# Patient Record
Sex: FEMALE | Race: AMERICAN INDIAN OR ALASKA NATIVE | NOT HISPANIC OR LATINO | URBAN - METROPOLITAN AREA
[De-identification: names, ages, dates, MRNs, and addresses within clinical notes are randomized per-mention and may not be internally consistent; named-entity substitution may affect disease eponyms.]

---

## 2021-01-16 ENCOUNTER — INPATIENT (INPATIENT)
Facility: HOSPITAL | Age: 51
LOS: 3 days | Discharge: HOME | End: 2021-01-20
Attending: HOSPITALIST | Admitting: HOSPITALIST
Payer: MEDICAID

## 2021-01-16 VITALS
DIASTOLIC BLOOD PRESSURE: 77 MMHG | RESPIRATION RATE: 16 BRPM | HEART RATE: 96 BPM | TEMPERATURE: 98 F | SYSTOLIC BLOOD PRESSURE: 126 MMHG | OXYGEN SATURATION: 100 %

## 2021-01-16 DIAGNOSIS — D64.9 ANEMIA, UNSPECIFIED: ICD-10-CM

## 2021-01-16 LAB
ALBUMIN SERPL ELPH-MCNC: 3.9 G/DL — SIGNIFICANT CHANGE UP (ref 3.3–5)
ALP SERPL-CCNC: 68 U/L — SIGNIFICANT CHANGE UP (ref 40–120)
ALT FLD-CCNC: 15 U/L — SIGNIFICANT CHANGE UP (ref 4–33)
ANION GAP SERPL CALC-SCNC: 12 MMOL/L — SIGNIFICANT CHANGE UP (ref 7–14)
APPEARANCE UR: ABNORMAL
APTT BLD: 27.7 SEC — SIGNIFICANT CHANGE UP (ref 27–36.3)
AST SERPL-CCNC: 22 U/L — SIGNIFICANT CHANGE UP (ref 4–32)
BASOPHILS # BLD AUTO: 0.04 K/UL — SIGNIFICANT CHANGE UP (ref 0–0.2)
BASOPHILS NFR BLD AUTO: 0.4 % — SIGNIFICANT CHANGE UP (ref 0–2)
BILIRUB SERPL-MCNC: <0.2 MG/DL — SIGNIFICANT CHANGE UP (ref 0.2–1.2)
BILIRUB UR-MCNC: NEGATIVE — SIGNIFICANT CHANGE UP
BLD GP AB SCN SERPL QL: NEGATIVE — SIGNIFICANT CHANGE UP
BLOOD GAS VENOUS COMPREHENSIVE RESULT: SIGNIFICANT CHANGE UP
BUN SERPL-MCNC: 13 MG/DL — SIGNIFICANT CHANGE UP (ref 7–23)
CALCIUM SERPL-MCNC: 8.8 MG/DL — SIGNIFICANT CHANGE UP (ref 8.4–10.5)
CHLORIDE SERPL-SCNC: 104 MMOL/L — SIGNIFICANT CHANGE UP (ref 98–107)
CO2 SERPL-SCNC: 23 MMOL/L — SIGNIFICANT CHANGE UP (ref 22–31)
COLOR SPEC: ABNORMAL
CREAT SERPL-MCNC: 0.8 MG/DL — SIGNIFICANT CHANGE UP (ref 0.5–1.3)
DIFF PNL FLD: ABNORMAL
EOSINOPHIL # BLD AUTO: 0 K/UL — SIGNIFICANT CHANGE UP (ref 0–0.5)
EOSINOPHIL NFR BLD AUTO: 0 % — SIGNIFICANT CHANGE UP (ref 0–6)
GLUCOSE SERPL-MCNC: 120 MG/DL — HIGH (ref 70–99)
GLUCOSE UR QL: NEGATIVE — SIGNIFICANT CHANGE UP
HCG SERPL-ACNC: <5 MIU/ML — SIGNIFICANT CHANGE UP
HCT VFR BLD CALC: 25.7 % — LOW (ref 34.5–45)
HGB BLD-MCNC: 7.6 G/DL — LOW (ref 11.5–15.5)
IANC: 9.08 K/UL — HIGH (ref 1.5–8.5)
IMM GRANULOCYTES NFR BLD AUTO: 0.9 % — SIGNIFICANT CHANGE UP (ref 0–1.5)
INR BLD: 1.05 RATIO — SIGNIFICANT CHANGE UP (ref 0.88–1.16)
KETONES UR-MCNC: ABNORMAL
LEUKOCYTE ESTERASE UR-ACNC: ABNORMAL
LYMPHOCYTES # BLD AUTO: 0.6 K/UL — LOW (ref 1–3.3)
LYMPHOCYTES # BLD AUTO: 5.8 % — LOW (ref 13–44)
MAGNESIUM SERPL-MCNC: 1.7 MG/DL — SIGNIFICANT CHANGE UP (ref 1.6–2.6)
MCHC RBC-ENTMCNC: 22.7 PG — LOW (ref 27–34)
MCHC RBC-ENTMCNC: 29.6 GM/DL — LOW (ref 32–36)
MCV RBC AUTO: 76.7 FL — LOW (ref 80–100)
MONOCYTES # BLD AUTO: 0.49 K/UL — SIGNIFICANT CHANGE UP (ref 0–0.9)
MONOCYTES NFR BLD AUTO: 4.8 % — SIGNIFICANT CHANGE UP (ref 2–14)
NEUTROPHILS # BLD AUTO: 9.08 K/UL — HIGH (ref 1.8–7.4)
NEUTROPHILS NFR BLD AUTO: 88.1 % — HIGH (ref 43–77)
NITRITE UR-MCNC: NEGATIVE — SIGNIFICANT CHANGE UP
NRBC # BLD: 0 /100 WBCS — SIGNIFICANT CHANGE UP
NRBC # FLD: 0 K/UL — SIGNIFICANT CHANGE UP
PH UR: 6 — SIGNIFICANT CHANGE UP (ref 5–8)
PHOSPHATE SERPL-MCNC: 4 MG/DL — SIGNIFICANT CHANGE UP (ref 2.5–4.5)
PLATELET # BLD AUTO: 308 K/UL — SIGNIFICANT CHANGE UP (ref 150–400)
POTASSIUM SERPL-MCNC: 3.9 MMOL/L — SIGNIFICANT CHANGE UP (ref 3.5–5.3)
POTASSIUM SERPL-SCNC: 3.9 MMOL/L — SIGNIFICANT CHANGE UP (ref 3.5–5.3)
PROT SERPL-MCNC: 6.8 G/DL — SIGNIFICANT CHANGE UP (ref 6–8.3)
PROT UR-MCNC: ABNORMAL
PROTHROM AB SERPL-ACNC: 11.9 SEC — SIGNIFICANT CHANGE UP (ref 10.6–13.6)
RBC # BLD: 3.35 M/UL — LOW (ref 3.8–5.2)
RBC # FLD: 15.2 % — HIGH (ref 10.3–14.5)
RH IG SCN BLD-IMP: POSITIVE — SIGNIFICANT CHANGE UP
RH IG SCN BLD-IMP: POSITIVE — SIGNIFICANT CHANGE UP
SARS-COV-2 RNA SPEC QL NAA+PROBE: SIGNIFICANT CHANGE UP
SODIUM SERPL-SCNC: 139 MMOL/L — SIGNIFICANT CHANGE UP (ref 135–145)
SP GR SPEC: 1.02 — SIGNIFICANT CHANGE UP (ref 1.01–1.02)
UROBILINOGEN FLD QL: SIGNIFICANT CHANGE UP
WBC # BLD: 10.3 K/UL — SIGNIFICANT CHANGE UP (ref 3.8–10.5)
WBC # FLD AUTO: 10.3 K/UL — SIGNIFICANT CHANGE UP (ref 3.8–10.5)

## 2021-01-16 PROCEDURE — 99218: CPT

## 2021-01-16 PROCEDURE — 71045 X-RAY EXAM CHEST 1 VIEW: CPT | Mod: 26

## 2021-01-16 PROCEDURE — 76830 TRANSVAGINAL US NON-OB: CPT | Mod: 26

## 2021-01-16 RX ORDER — ACETAMINOPHEN 500 MG
975 TABLET ORAL ONCE
Refills: 0 | Status: COMPLETED | OUTPATIENT
Start: 2021-01-16 | End: 2021-01-16

## 2021-01-16 RX ADMIN — Medication 975 MILLIGRAM(S): at 21:46

## 2021-01-16 NOTE — ED ADULT NURSE NOTE - NSIMPLEMENTINTERV_GEN_ALL_ED
Implemented All Fall Risk Interventions:  [place] to call system. Call bell, personal items and telephone within reach. Instruct patient to call for assistance. Room bathroom lighting operational. Non-slip footwear when patient is off stretcher. Physically safe environment: no spills, clutter or unnecessary equipment. Stretcher in lowest position, wheels locked, appropriate side rails in place. Provide visual cue, wrist band, yellow gown, etc. Monitor gait and stability. Monitor for mental status changes and reorient to person, place, and time. Review medications for side effects contributing to fall risk. Reinforce activity limits and safety measures with patient and family.

## 2021-01-16 NOTE — ED ADULT NURSE NOTE - OBJECTIVE STATEMENT
Receive pt in intake alert and oriented x 4 c/o vag bleed; approx 10 pad/day, dizziness and syncope/fall this am, unwitnessed, headache, dizziness, and weakness.  Resp unlabored.  Skin intact.  IV placed. Lab sent

## 2021-01-16 NOTE — CONSULT NOTE ADULT - ASSESSMENT
51 yo F with a pmhx of anemia presents to the ED with vaginal bleeding that has been occurring since 12/26. She admits to vaginal bleeding that waxes and wanes with the worst episodes soaking 10 pads in 1 day. She has inconsistent menstrual periods. She denies any abdominal pain. Patient presented today with syncopal episode and fell onto the floor after stepping out of the shower. She denies any denies, SOB. She admits to generalized weakness that has been progressive and worsening. She denies any N/V/D.    Syncope   - In the setting of Anemia secondary to vaginal bleeding   - Incidental finding of Brugada type II on ECG  - Check TTE   - If patient has fever, please treat with Tylenol immediately  - Please avoid these medications. See [email or web address]    49 yo F with a pmhx of anemia presents to the ED with vaginal bleeding that has been occurring since 12/26. She admits to vaginal bleeding that waxes and wanes with the worst episodes soaking 10 pads in 1 day. She has inconsistent menstrual periods. She denies any abdominal pain. Patient presented today with syncopal episode and fell onto the floor after stepping out of the shower. She denies any denies, SOB. She admits to generalized weakness that has been progressive and worsening. She denies any N/V/D.    Syncope   - In the setting of Anemia secondary to vaginal bleeding   - Incidental finding of Brugada type II on ECG  - No family history of sudden cardiac death   - Check TTE   - If patient has fever, please treat with Tylenol immediately  - Please avoid these medications. See [email or web address]

## 2021-01-16 NOTE — CONSULT NOTE ADULT - SUBJECTIVE AND OBJECTIVE BOX
CHIEF COMPLAINT: Vaginal bleeding     HISTORY OF PRESENT ILLNESS:    51 yo F with a pmhx of anemia presents to the ED with vaginal bleeding that has been occurring since 12/26. She admits to vaginal bleeding that waxes and wanes with the worst episodes soaking 10 pads in 1 day. She has inconsistent menstrual periods. She denies any abdominal pain. Patient presented today with syncopal episode and fell onto the floor after stepping out of the shower. She denies any denies, SOB. She admits to generalized weakness that has been progressive and worsening. She denies any N/V/D.         Allergies    No Known Allergies    Intolerances    	    MEDICATIONS:  None     PAST MEDICAL & SURGICAL HISTORY:  Anemia        FAMILY HISTORY:      SOCIAL HISTORY:    [x] Non-smoker  [ ] Smoker  [ ] Alcohol  [x] Denies Alcohol      REVIEW OF SYSTEMS:  CONSTITUTIONAL: no fevers or chills  EYES/ENT: No visual changes; No vertigo or throat pain  NECK: No pain  RESPIRATORY:  No cough, wheezing, chills or hemoptysis, SOB  CARDIOVASCULAR: No chest pain, palpitations, passing out, + dizziness, no leg swelling  GASTROINTESTINAL: No abdominal or epigastric pain. No nausea/vomiting/melena  Genitourinary: No dysuria, frequency, hematuria, + vaginal bleeding   NEUROLOGICAL: No numbness or weakness  SKIN: No itching, burning, rashes or lesions      PHYSICAL EXAM:  T(C): 36.6 (01-16-21 @ 12:21), Max: 36.6 (01-16-21 @ 12:21)  HR: 96 (01-16-21 @ 12:21) (96 - 96)  BP: 126/77 (01-16-21 @ 12:21) (126/77 - 126/77)  RR: 16 (01-16-21 @ 12:21) (16 - 16)  SpO2: 100% (01-16-21 @ 12:21) (100% - 100%)    Vital Signs Last 24 Hrs  T(C): 36.6 (16 Jan 2021 12:21), Max: 36.6 (16 Jan 2021 12:21)  T(F): 97.9 (16 Jan 2021 12:21), Max: 97.9 (16 Jan 2021 12:21)  HR: 96 (16 Jan 2021 12:21) (96 - 96)  BP: 126/77 (16 Jan 2021 12:21) (126/77 - 126/77)  RR: 16 (16 Jan 2021 12:21) (16 - 16)  SpO2: 100% (16 Jan 2021 12:21) (100% - 100%)    I&O's Summary        Appearance: Normal	  HEENT:   Normal oral mucosa	  Cardiovascular: Normal S1 S2, No JVD, No murmurs, No edema  Respiratory: Lungs clear to auscultation	  Psychiatry: A & O x 3, Mood & affect appropriate  Gastrointestinal:  Soft, Non-tender, + BS	  Skin: No rashes, No ecchymoses, No cyanosis	  Neurologic: Non-focal  Extremities: Warm and well perfused. 2+ pulses bilaterally        LABS:	 	    CBC Full  -  ( 16 Jan 2021 14:42 )  WBC Count : 10.30 K/uL  Hemoglobin : 7.6 g/dL  Hematocrit : 25.7 %  Platelet Count - Automated : 308 K/uL  Mean Cell Volume : 76.7 fL  Mean Cell Hemoglobin : 22.7 pg  Mean Cell Hemoglobin Concentration : 29.6 gm/dL  Auto Neutrophil # : 9.08 K/uL  Auto Lymphocyte # : 0.60 K/uL  Auto Monocyte # : 0.49 K/uL  Auto Eosinophil # : 0.00 K/uL  Auto Basophil # : 0.04 K/uL  Auto Neutrophil % : 88.1 %  Auto Lymphocyte % : 5.8 %  Auto Monocyte % : 4.8 %  Auto Eosinophil % : 0.0 %  Auto Basophil % : 0.4 %    01-16    139  |  104  |  13  ----------------------------<  120<H>  3.9   |  23  |  0.80    Ca    8.8      16 Jan 2021 14:42  Phos  4.0     01-16  Mg     1.7     01-16    TPro  6.8  /  Alb  3.9  /  TBili  <0.2  /  DBili  x   /  AST  22  /  ALT  15  /  AlkPhos  68  01-16      CARDIAC MARKERS:  None     TELEMETRY: NSR	    ECG: NSR with type II Brugada  	    PREVIOUS DIAGNOSTIC TESTING:

## 2021-01-16 NOTE — ED CDU PROVIDER INITIAL DAY NOTE - PROGRESS NOTE DETAILS
This patient was signed out to me by CDU day AYANNA Browning; pt was dispo'd to CDU for continued care plan:  Syncope/Symptomatic anemia/EKG showing Brugada type 2:  Tele monitoring, transfuse PRBCs per orders, repeat CBC; EP is consulting on pt and advised echo in the AM; EP is following pt; general observation care / monitoring.  In the interim, pt was objectively noted to be resting comfortably.  Pre-transfusion of first unit PRBCs, pt's vitals were checked and the pt was found to be febrile (T 101.6) as per ED RN.  Unclear source of fever.  This was discussed with ED attending Dr. Dorado; repeat EKG was ordered (no change from first EKG as per Dr. Dorado), Tylenol was ordered for fever, and plan to proceed with transfusion.  Given fever, unclear source, presence of Brugada on EKG, pt will need to be admitted to the inpatient service for further care given this development.  I spoke to the covering cardiology NP (Varinder) and it was advised that patient be admitted to the hospitalist on Tele.  I spoke to the covering hospitalist Dr. Cartagena; pt was accepted for admission to his service for further care.  MAR is being text paged. CDU Att PN: Estrada  ECG brought to me for review by AYANNA Le at time of admission appears stable compared to initial, though febrile now (prior to PRBC) and was afebrile earlier.  To be admitted, per cardiology recommendation.  I have personally performed a face to face evaluation of this patient including review of the history and focused physical exam.  I have discussed the case and reviewed the plan of care with the PA.

## 2021-01-16 NOTE — ED PROVIDER NOTE - OBJECTIVE STATEMENT
49 yo F with a pmhx of anemia presents to the ED with vaginal bleeding that has been occurring since 12/26. She admits to vaginal bleeding that waxes and wanes with the worst episodes soaking 10 pads in 1 day. She has inconsistent menstrual periods. She denies any abdominal pain. Today, at home, she synopsized and fell onto the floor after stepping out of the shower. She denies any CP, SOB. She admits to generalized weakness that has been progressive and worsening. She denies any N/V/D.  She follows with OBGYN and was last seen in nov.

## 2021-01-16 NOTE — ED ADULT NURSE NOTE - CHIEF COMPLAINT QUOTE
daughter eduar [phone] violeta speaking. vag bleeding  since 12/26 states 10 pads per day.  last seen nov by gyn/similar problem. , states became dizzy this am and fell  and awoke on floor. denies injury. presently c/o weakness,headache fs 126

## 2021-01-16 NOTE — ED PROVIDER NOTE - CLINICAL SUMMARY MEDICAL DECISION MAKING FREE TEXT BOX
51 yo F with a pmhx of anemia presents to the ED with vaginal bleeding that has been occurring since 12/26. She admits to vaginal bleeding that waxes and wanes with the worst episodes soaking 10 pads in 1 day. She has inconsistent menstrual periods. She denies any abdominal pain. Today, at home, she synopsized and fell onto the floor after stepping out of the shower. No chest pain, SOB, no cardiac pathology history. PE significant for (+) blood observed in the vaginal vault, cervical os difficult to visualized 2/2 blood, (+) Cervical motion tenderness, (+) L adnexal tenderness  concerns for  reason for bleeding. no trauma. no abdominal tenderness. no history of bleeding disorder.

## 2021-01-16 NOTE — ED PROVIDER NOTE - ATTENDING CONTRIBUTION TO CARE
I have personally performed a face to face bedside history and physical examination of this patient. I have discussed the history, examination, review of systems, assessment and plan of management with the resident. I have reviewed the electronic medical record and amended it to reflect my history, review of systems, physical exam, assessment and plan.    Brief HPI:  49 yo F with a pmhx of anemia presents to the ED with vaginal bleeding that has been occurring since 12/26.  Reports episode of syncope today.  Denies cp, sob.      Vitals:   Reviewed    Exam:    GEN:  Non-toxic appearing, non-distressed, speaking full sentences, non-diaphoretic, AAOx3  HEENT:  NCAT, neck supple, EOMI, PERRLA, sclera anicteric, no conjunctival pallor or injection, no stridor, normal voice, no tonsillar exudate, uvula midline, tympanic membranes and external auditory canals normal appearing bilaterally   CV:  regular rhythm and rate, s1/s2 audible, no murmurs, rubs or gallops, peripheral pulses 2+ and symmetric  PULM:  non-labored respirations, lungs clear to auscultation bilaterally, no wheezes, crackles or rales  ABD:  non distended, non-tender, no rebound, no guarding, negative San's sign, bowel sounds normal, no cvat  MSK:  no gross deformity, non-tender extremities and joints, range of motion grossly normal appearing, no extremity edema, extremities warm and well perfused   NEURO:  AAOx3, CN II-XII intact, motor 5/5 in upper and lower extremities bilaterally, sensation grossly intact in extremities and trunk, finger to nose testing wnl, no nystagmus, negative Romberg, no pronator drift, no gait deficit  SKIN:  warm, dry, no rash or vesicles   GYN:  see resident note    A/P:  49 yo F with a pmhx of anemia presents to the ED with vaginal bleeding and episode of syncope today.  VSS.  Exam with non-tender abdomen, active vaginal bleeding.  EKG nsr with brugada type II pattern.  Suspect syncope 2/2 anemia vs. possible cardiac arrythmia.  Plan for labs, tele, tvus, cards and gyn c/s.  Anticipate cdu vs. admission.

## 2021-01-16 NOTE — ED PROVIDER NOTE - PHYSICAL EXAMINATION
GENERAL: no acute distress, non-toxic appearing  HEAD: normocephalic, atraumatic  HEENT: (+) pale conjunctiva, oral mucosa moist, neck supple  CARDIAC: regular rate and rhythm, normal S1 and S2,  no appreciable murmurs  PULM: clear to ascultation bilaterally, no crackles, rales, rhonchi, or wheezing  GI: abdomen nondistended, soft, nontender, no guarding or rebound tenderness  : no CVA tenderness, no suprapubic tenderness  NEURO: alert and oriented x 3, normal speech, PERRLA, EOMI, no focal motor or sensory deficits, nonantalgic gait  MSK: no visible deformities, no peripheral edema, calf tenderness/redness/swelling  SKIN: no visible rashes, dry, well-perfused  PSYCH: appropriate mood and affect      Pelvic Exam: (+) blood observed in the vaginal vault, cervical os difficult to visualized 2/2 blood, (+) Cervical motion tenderness, (+) L adnexal tenderness  Chaperoned by Debbi MALONE

## 2021-01-16 NOTE — ED PROVIDER NOTE - NS ED ROS FT
GENERAL: no fever, chills, (+) generalized weakness  HEENT: no cough, congestion, odynophagia, dysphagia  CARDIAC: no chest pain, palpitations, lightheadedness  PULM: no dyspnea, wheezing   GI: no abdominal pain, nausea, vomiting, diarrhea, constipation, melena, hematochezia  : no urinary dysuria, frequency, incontinence, hematuria  NEURO: no headache, motor weakness, sensory changes  MSK: no joint or muscle pain  SKIN: no rashes, hives  HEME: no active bleeding, bruising

## 2021-01-16 NOTE — ED CDU PROVIDER INITIAL DAY NOTE - OBJECTIVE STATEMENT
ED Provider Note: 51 yo F with a pmhx of anemia presents to the ED with vaginal bleeding that has been occurring since 12/26. She admits to vaginal bleeding that waxes and wanes with the worst episodes soaking 10 pads in 1 day. She has inconsistent menstrual periods. She denies any abdominal pain. Today, at home, she synopsized and fell onto the floor after stepping out of the shower. She denies any CP, SOB. She admits to generalized weakness that has been progressive and worsening. She denies any N/V/D.  She follows with OBGYN and was last seen in nov.  CDU Initial Day Note: AYANNA Browning, Agree w/ above, pt presented w/ syncopal event presumed to be in setting of anemia/vaginal bleeding. Pt found to have concerning EKG for Brugada, pt will be transfused in CDU, appreciate cards/OB recs.

## 2021-01-16 NOTE — ED CDU PROVIDER INITIAL DAY NOTE - MEDICAL DECISION MAKING DETAILS
A:  -49 yo F with a pmhx of anemia presents w/ syncope and symptomatic anemia, will transfuse, obs on tele, appreciate recs from OB/GYN and Cardiology  P:  -Transfuse 1 unit prbc's, repeat CBC post-transfusion, tele, TTE

## 2021-01-16 NOTE — CONSULT NOTE ADULT - SUBJECTIVE AND OBJECTIVE BOX
HAYDEN KAUR  50y  Female [id number]    HPI: 49yo G_P_, LMP , presenting with vaginal bleeding and syncope at home.      Ob/Gyn Physician: Dr. Moreland    Obhx  GynHx: Denies fibroids, cysts, abnormal pap smears, STIs  PMH:  PSH:  FMH:  Social: Denies Toxic Habits x3; denies anxiety/depression  Meds:  Allergies: [facility] Meds:   MEDICATIONS  (STANDING):    MEDICATIONS  (PRN):      Allergies    No Known Allergies    Intolerances        PAST MEDICAL & SURGICAL HISTORY:  Anemia        FAMILY HISTORY:        Vital Signs Last 24 Hrs  T(C): 36.6 (2021 12:21), Max: 36.6 (2021 12:21)  T(F): 97.9 (2021 12:21), Max: 97.9 (2021 12:21)  HR: 96 (2021 12:21) (96 - 96)  BP: 126/77 (2021 12:21) (126/77 - 126/77)  BP(mean): --  RR: 16 (2021 12:21) (16 - 16)  SpO2: 100% (2021 12:21) (100% - 100%)    Physical Exam:   General: sitting comfortably in bed, NAD   CV: RR S1S2 no m/r/g  Lungs: CTA b/l, unlabored on RA  Back: No CVA tenderness  Abd: Soft, non-tender, non-distended,  +BS  :  No bleeding on pad.  External labia wnl.   Speculum Exam: No active bleeding from os.  Physiologic discharge.  Bimanual exam with no cervical motion tenderness, uterus wnl, adnexa non palpable b/l.  Cervix closed vs. Cervix dilated    cm   Ext: non-tender b/l, no edema     LABS:                              7.6    10.30 )-----------( 308      ( 2021 14:42 )             25.7     01-16    139  |  104  |  13  ----------------------------<  120<H>  3.9   |  23  |  0.80    Ca    8.8      2021 14:42  Phos  4.0     01-16  Mg     1.7         TPro  6.8  /  Alb  3.9  /  TBili  <0.2  /  DBili  x   /  AST  22  /  ALT  15  /  AlkPhos  68      I&O's Detail    PT/INR - ( 2021 14:42 )   PT: 11.9 sec;   INR: 1.05 ratio         PTT - ( 2021 14:42 )  PTT:27.7 sec  Urinalysis Basic - ( 2021 15:18 )    Color: Dark Brown / Appearance: Turbid / S.016 / pH: x  Gluc: x / Ketone: Trace  / Bili: Negative / Urobili: <2 mg/dL   Blood: x / Protein: 100 mg/dL / Nitrite: Negative   Leuk Esterase: Small / RBC: >10 /HPF / WBC 5-10 /HPF   Sq Epi: x / Non Sq Epi: Occasional / Bacteria: Few        RADIOLOGY & ADDITIONAL STUDIES:    < from: US Transvaginal (21 @ 15:38) >    EXAM:  US TRANSVAGINAL        PROCEDURE DATE:  2021         INTERPRETATION:  CLINICAL INFORMATION: Vaginal bleeding    LMP: 2020    COMPARISON: None available.    TECHNIQUE:  Endovaginal and transabdominal pelvic sonogram. Colorand Spectral Doppler was performed.    FINDINGS:    Uterus: Enlarged, measuring 13.2 x 9.0 x 8.9. No discrete fibroid. Diffuse heterogeneous echogenicity of the myometrium suggestive of adenomyosis. Resulting shadowing limits full evaluation of the uterus.  Endometrium: Thickened, measuring approximately 2.5 cm.    Right ovary: Unable to be visualized.  Left ovary: Unable to be visualized.    Fluid: None.    IMPRESSION:  Enlarged uterus with diffusely heterogeneous myometrium. Findings raise suspicion for adenomyosis.  Abnormally thickened endometrium.  Recommend nonemergent pelvic MRI to better evaluate the above findings due to the limited nature of this study.  Unable to visualize the ovaries.              ELADIO LOVE MD; Attending Radiologist  This document has been electronically signed. 2021  3:49PM    < end of copied text >   HAYDEN KAUR  50y  Female [id number]    HPI: 51yo P3,, LMP , w/ PMH anemia p/w vaginal bleeding and syncope at home. Pt states she woke up this morning and fell in her bathroom around 5:30am. States she hit her head and eye and lost consiousness. Reports daily bleeding since , alternating between spotting and heavy vaginal bleeding. States at its heaviest, she is soaking through 10 pads/day. States her bleeding is minimal today. She reports dizziness/lightheadedness, weakness, fatigue. Denies chest pain, shortness of breath, nausea, vomiting, fevers, chills.      Ob/Gyn Physician: Dr. Moreland; last seen 2020    Obhx:  x3  GynHx: +Adenomysosis; Denies fibroids, cysts, abnormal pap smears, STIs  PMH: anemia  PSH: denies  Social: Denies Toxic Habits x3; denies anxiety/depression  Meds: Iron  Allergies: NKDA      Vital Signs Last 24 Hrs  T(C): 36.6 (2021 12:21), Max: 36.6 (2021 12:21)  T(F): 97.9 (2021 12:21), Max: 97.9 (2021 12:21)  HR: 96 (2021 12:21) (96 - 96)  BP: 126/77 (2021 12:21) (126/77 - 126/77)  BP(mean): --  RR: 16 (2021 12:21) (16 - 16)  SpO2: 100% (2021 12:21) (100% - 100%)    Physical Exam:   General: sitting comfortably in bed, NAD   CV: RR S1S2 no m/r/g  Lungs: CTA b/l, unlabored on RA  Back: No CVA tenderness  Abd: Soft, non-tender, non-distended,  +BS  :  minimal bleeding on pad.  External labia wnl.   Speculum Exam: Small clot in vaginal vault, No active bleeding from os. Bimanual exam with no cervical motion tenderness, uterus approximately 13 week size, adnexa non palpable b/l.  Cervix closed  Ext: non-tender b/l, no edema     LABS:                              7.6    10.30 )-----------( 308      ( 2021 14:42 )             25.7         139  |  104  |  13  ----------------------------<  120<H>  3.9   |  23  |  0.80    Ca    8.8      2021 14:42  Phos  4.0       Mg     1.7         TPro  6.8  /  Alb  3.9  /  TBili  <0.2  /  DBili  x   /  AST  22  /  ALT  15  /  AlkPhos  68      I&O's Detail    PT/INR - ( 2021 14:42 )   PT: 11.9 sec;   INR: 1.05 ratio         PTT - ( 2021 14:42 )  PTT:27.7 sec  Urinalysis Basic - ( 2021 15:18 )    Color: Dark Brown / Appearance: Turbid / S.016 / pH: x  Gluc: x / Ketone: Trace  / Bili: Negative / Urobili: <2 mg/dL   Blood: x / Protein: 100 mg/dL / Nitrite: Negative   Leuk Esterase: Small / RBC: >10 /HPF / WBC 5-10 /HPF   Sq Epi: x / Non Sq Epi: Occasional / Bacteria: Few        RADIOLOGY & ADDITIONAL STUDIES:    < from: US Transvaginal (21 @ 15:38) >    EXAM:  US TRANSVAGINAL        PROCEDURE DATE:  2021         INTERPRETATION:  CLINICAL INFORMATION: Vaginal bleeding    LMP: 2020    COMPARISON: None available.    TECHNIQUE:  Endovaginal and transabdominal pelvic sonogram. Colorand Spectral Doppler was performed.    FINDINGS:    Uterus: Enlarged, measuring 13.2 x 9.0 x 8.9. No discrete fibroid. Diffuse heterogeneous echogenicity of the myometrium suggestive of adenomyosis. Resulting shadowing limits full evaluation of the uterus.  Endometrium: Thickened, measuring approximately 2.5 cm.    Right ovary: Unable to be visualized.  Left ovary: Unable to be visualized.    Fluid: None.    IMPRESSION:  Enlarged uterus with diffusely heterogeneous myometrium. Findings raise suspicion for adenomyosis.  Abnormally thickened endometrium.  Recommend nonemergent pelvic MRI to better evaluate the above findings due to the limited nature of this study.  Unable to visualize the ovaries.              ELADIO LOVE MD; Attending Radiologist  This document has been electronically signed. 2021  3:49PM    < end of copied text >

## 2021-01-16 NOTE — ED ADULT TRIAGE NOTE - CHIEF COMPLAINT QUOTE
daughter eduar [phone] violeta speaking. vag bleeding  since 12/26 states 10 pads per day.  last seen nov by gyn/similar problem. , states became dizzy this am and fell  and awoke on floor. denies injury. presently c/o weakness,headache daughter eduar [phone] violeta speaking. vag bleeding  since 12/26 states 10 pads per day.  last seen nov by gyn/similar problem. , states became dizzy this am and fell  and awoke on floor. denies injury. presently c/o weakness,headache fs 126

## 2021-01-16 NOTE — CONSULT NOTE ADULT - ASSESSMENT
49 yo G_P_, LMP 12/26 presenting w/ vaginal bleeding and syncope at home, AUB likely 2/2 adenomyosis given TVUS findings. Pt hemodynamically stable but w/ H/H 7.6/25.7....    recs pending    KARLIE Castle, PGY-2  to be discussed w/ attending 51 yo P3 LMP 12/26 w/ PMH Adenomyosis, Anemia presenting w/ vaginal bleeding and syncope at home. TVUS consistent w/ known history of adenomyosis Pt hemodynamically stable but w/ H/H 7.6/25.7. Given episode of syncope, recommend PRBC 2/2 symptomatic anemia    - recommend CDU -> 1uPRBC and 4hr post-transfusion CBC  - further syncope w/u per ED  - pt to call Dr. Moreland's office Monday for appointment this week to discuss definitive treatment   - will continue to follow    KARLIE Castle, PGY-2  d/w Dr. Torres

## 2021-01-16 NOTE — ED CDU PROVIDER DISPOSITION NOTE - CLINICAL COURSE
51 yo female, PMH anemia, presented to the ED for intermittent vaginal bleeding and syncopal episode earlier today.  In the ED, pt was found to have Hb 7.6; Ob/Gyn was consulted; transfusion was advised.  In addition, EKG performed showed Brugada Type 2; EP was consulted; tele monitoring and echo in the AM was advised.  Pt was dispo'd to CDU for continued care plan:  Syncope/Symptomatic anemia/EKG showing Brugada type 2:  Tele monitoring, transfuse PRBCs per orders, repeat CBC; EP is consulting on pt and advised echo in the AM; EP is following pt; general observation care / monitoring.    In the interim, pt was objectively noted to be resting comfortably.  Prior to transfusion of first unit PRBCs, pt's vitals were checked and the pt was found to be febrile (T 101.6) as per ED RN.  Unclear source of fever.  This was discussed with ED attending Dr. Dorado; repeat EKG was ordered (no change from first EKG as per Dr. Dorado), Tylenol was ordered for fever, and plan to proceed with transfusion.  Given fever, unclear source, presence of Brugada on EKG, pt will need to be admitted to the inpatient service for further care given this development.  I spoke to the covering cardiology NP (Varinder) and it was advised that patient be admitted to the hospitalist on Tele.  I spoke to the covering hospitalist Dr. Cartagena; pt was accepted for admission to his service for further care.  No indication for antibiotics at this time as d/w hospitalist given unclear source of fever.  COVID result is pending but CXR performed earlier shows no acute pathology.  Blood cultures were ordered.  MAR is being text paged to notify of this admission. CDU Att Admit Note: Estrada  51 yo female, PMH anemia, presented to the ED for intermittent vaginal bleeding and syncopal episode earlier today.  In the ED, pt was found to have Hb 7.6; Ob/Gyn was consulted; transfusion was advised.  In addition, EKG performed showed Brugada Type 2; EP was consulted; tele monitoring and echo in the AM was advised.  Pt was dispo'd to CDU for continued care plan:  Syncope/Symptomatic anemia/EKG showing Brugada type 2:  Tele monitoring, transfuse PRBCs per orders, repeat CBC; EP is consulting on pt and advised echo in the AM; EP is following pt; general observation care / monitoring.    In the interim, pt was objectively noted to be resting comfortably.  Prior to transfusion of first unit PRBCs, pt's vitals were checked and the pt was found to be febrile (T 101.6) as per ED RN.  Unclear source of fever.  Tylenol was ordered for fever, and plan to proceed with transfusion.  Given fever, unclear source, presence of Brugada on EKG, pt will need to be admitted to the inpatient service for further care given this development - per cards.  I spoke to the covering cardiology NP (Varinder) and it was advised that patient be admitted to the hospitalist on Tele.  I spoke to the covering hospitalist Dr. Cartagena; pt was accepted for admission to his service for further care.  No indication for antibiotics at this time as d/w hospitalist given unclear source of fever.  COVID result is pending but CXR performed earlier shows no acute pathology.  Blood cultures were ordered.  MAR is being text paged to notify of this admission.        I have personally performed a face to face evaluation of this patient including review of the history and focused physical exam.  I have discussed the case and reviewed the plan of care with the PA.

## 2021-01-16 NOTE — ED ADULT NURSE REASSESSMENT NOTE - NS ED NURSE REASSESS COMMENT FT1
pt resting comfortably, denies any acute complaints. Resps are even and unlabored, spo2 100% On Ra. Pre transfusion VS noted that temp was elevated. AYANNA Le and MD Dorado consulted, okay to start blood. Meds given as ordered. Pt able to verbalize understanding of transfusion reactions symptoms, states she will notify RN if symptomatic. Blood started at 2145 to a 20G IV to right wrist, no signs of infiltration at this time.

## 2021-01-17 DIAGNOSIS — N93.9 ABNORMAL UTERINE AND VAGINAL BLEEDING, UNSPECIFIED: ICD-10-CM

## 2021-01-17 DIAGNOSIS — R55 SYNCOPE AND COLLAPSE: ICD-10-CM

## 2021-01-17 DIAGNOSIS — D64.9 ANEMIA, UNSPECIFIED: ICD-10-CM

## 2021-01-17 DIAGNOSIS — Z29.9 ENCOUNTER FOR PROPHYLACTIC MEASURES, UNSPECIFIED: ICD-10-CM

## 2021-01-17 DIAGNOSIS — R50.9 FEVER, UNSPECIFIED: ICD-10-CM

## 2021-01-17 DIAGNOSIS — I49.8 OTHER SPECIFIED CARDIAC ARRHYTHMIAS: ICD-10-CM

## 2021-01-17 DIAGNOSIS — Z02.9 ENCOUNTER FOR ADMINISTRATIVE EXAMINATIONS, UNSPECIFIED: ICD-10-CM

## 2021-01-17 LAB
A1C WITH ESTIMATED AVERAGE GLUCOSE RESULT: 5.3 % — SIGNIFICANT CHANGE UP (ref 4–5.6)
ALBUMIN SERPL ELPH-MCNC: 3.6 G/DL — SIGNIFICANT CHANGE UP (ref 3.3–5)
ALP SERPL-CCNC: 58 U/L — SIGNIFICANT CHANGE UP (ref 40–120)
ALT FLD-CCNC: 15 U/L — SIGNIFICANT CHANGE UP (ref 4–33)
ANION GAP SERPL CALC-SCNC: 10 MMOL/L — SIGNIFICANT CHANGE UP (ref 7–14)
AST SERPL-CCNC: 19 U/L — SIGNIFICANT CHANGE UP (ref 4–32)
BILIRUB SERPL-MCNC: 0.6 MG/DL — SIGNIFICANT CHANGE UP (ref 0.2–1.2)
BUN SERPL-MCNC: 10 MG/DL — SIGNIFICANT CHANGE UP (ref 7–23)
CALCIUM SERPL-MCNC: 8.1 MG/DL — LOW (ref 8.4–10.5)
CHLORIDE SERPL-SCNC: 108 MMOL/L — HIGH (ref 98–107)
CHOLEST SERPL-MCNC: 156 MG/DL — SIGNIFICANT CHANGE UP
CO2 SERPL-SCNC: 21 MMOL/L — LOW (ref 22–31)
CREAT SERPL-MCNC: 0.73 MG/DL — SIGNIFICANT CHANGE UP (ref 0.5–1.3)
ESTIMATED AVERAGE GLUCOSE: 105 MG/DL — SIGNIFICANT CHANGE UP (ref 68–114)
FERRITIN SERPL-MCNC: 20 NG/ML — SIGNIFICANT CHANGE UP (ref 15–150)
FOLATE SERPL-MCNC: 14 NG/ML — SIGNIFICANT CHANGE UP (ref 3.1–17.5)
GLUCOSE SERPL-MCNC: 102 MG/DL — HIGH (ref 70–99)
HCT VFR BLD CALC: 27.1 % — LOW (ref 34.5–45)
HDLC SERPL-MCNC: 37 MG/DL — LOW
HGB BLD-MCNC: 8.3 G/DL — LOW (ref 11.5–15.5)
IRON SATN MFR SERPL: 59 UG/DL — SIGNIFICANT CHANGE UP (ref 30–160)
LIPID PNL WITH DIRECT LDL SERPL: 86 MG/DL — SIGNIFICANT CHANGE UP
MAGNESIUM SERPL-MCNC: 1.9 MG/DL — SIGNIFICANT CHANGE UP (ref 1.6–2.6)
MCHC RBC-ENTMCNC: 23.4 PG — LOW (ref 27–34)
MCHC RBC-ENTMCNC: 30.6 GM/DL — LOW (ref 32–36)
MCV RBC AUTO: 76.3 FL — LOW (ref 80–100)
NON HDL CHOLESTEROL: 119 MG/DL — SIGNIFICANT CHANGE UP
NRBC # BLD: 0 /100 WBCS — SIGNIFICANT CHANGE UP
NRBC # FLD: 0 K/UL — SIGNIFICANT CHANGE UP
PHOSPHATE SERPL-MCNC: 3.1 MG/DL — SIGNIFICANT CHANGE UP (ref 2.5–4.5)
PLATELET # BLD AUTO: 249 K/UL — SIGNIFICANT CHANGE UP (ref 150–400)
POTASSIUM SERPL-MCNC: 3.5 MMOL/L — SIGNIFICANT CHANGE UP (ref 3.5–5.3)
POTASSIUM SERPL-SCNC: 3.5 MMOL/L — SIGNIFICANT CHANGE UP (ref 3.5–5.3)
PROT SERPL-MCNC: 6.2 G/DL — SIGNIFICANT CHANGE UP (ref 6–8.3)
RBC # BLD: 3.55 M/UL — LOW (ref 3.8–5.2)
RBC # BLD: 3.55 M/UL — LOW (ref 3.8–5.2)
RBC # FLD: 15.2 % — HIGH (ref 10.3–14.5)
RETICS #: 79.5 K/UL — SIGNIFICANT CHANGE UP (ref 25–125)
RETICS/RBC NFR: 2.2 % — SIGNIFICANT CHANGE UP (ref 0.5–2.5)
SODIUM SERPL-SCNC: 139 MMOL/L — SIGNIFICANT CHANGE UP (ref 135–145)
TRIGL SERPL-MCNC: 163 MG/DL — HIGH
TSH SERPL-MCNC: 2.57 UIU/ML — SIGNIFICANT CHANGE UP (ref 0.27–4.2)
VIT B12 SERPL-MCNC: 339 PG/ML — SIGNIFICANT CHANGE UP (ref 200–900)
WBC # BLD: 5.34 K/UL — SIGNIFICANT CHANGE UP (ref 3.8–10.5)
WBC # FLD AUTO: 5.34 K/UL — SIGNIFICANT CHANGE UP (ref 3.8–10.5)

## 2021-01-17 PROCEDURE — 99223 1ST HOSP IP/OBS HIGH 75: CPT | Mod: GC

## 2021-01-17 PROCEDURE — 93306 TTE W/DOPPLER COMPLETE: CPT | Mod: 26

## 2021-01-17 RX ORDER — FERROUS SULFATE 325(65) MG
325 TABLET ORAL DAILY
Refills: 0 | Status: DISCONTINUED | OUTPATIENT
Start: 2021-01-17 | End: 2021-01-20

## 2021-01-17 RX ORDER — FERROUS SULFATE 325(65) MG
0 TABLET ORAL
Qty: 0 | Refills: 0 | DISCHARGE

## 2021-01-17 RX ORDER — ACETAMINOPHEN 500 MG
650 TABLET ORAL ONCE
Refills: 0 | Status: COMPLETED | OUTPATIENT
Start: 2021-01-17 | End: 2021-01-17

## 2021-01-17 RX ORDER — INFLUENZA VIRUS VACCINE 15; 15; 15; 15 UG/.5ML; UG/.5ML; UG/.5ML; UG/.5ML
0.5 SUSPENSION INTRAMUSCULAR ONCE
Refills: 0 | Status: DISCONTINUED | OUTPATIENT
Start: 2021-01-17 | End: 2021-01-17

## 2021-01-17 RX ADMIN — Medication 325 MILLIGRAM(S): at 12:20

## 2021-01-17 RX ADMIN — Medication 650 MILLIGRAM(S): at 16:01

## 2021-01-17 NOTE — CONSULT NOTE ADULT - SUBJECTIVE AND OBJECTIVE BOX
Bradford Nicole MD  Interventional Cardiology / Advance Heart Failure and Cardiac Transplant Specialist  [place] Office : [address] [place] [address] [place]. [address]  Tel:   [place] Office : [address] [facility] [place] [address]  Tel: [phone]  Cell : [phone]    HISTORY OF PRESENTING ILLNESS:  49y/o Female with a significant PMHx of anemia presents to [facility] c/o intermittent vaginal bleeding since 12/26/20. Pt reports worsening bleeding over the past 2-3 days, soaking 5-6 pads per day. She reports similar episodes previously in November for which she was following up with her PCP/GYN, per pt no acute intervention at that time. Pt also endorses a syncopal episode at home earlier today while coming out of the shower, reports falling onto the floor. ROS +vaginal bleeding, syncope and fatigue. Pt denies HA, change in vision, dizziness, light-headedness, chest pain, SOB, palpitations, diaphoresis, FISHMAN, abdominal pain, nausea, vomiting or diarrhea. Denies pelvic pain, dysuria, hematuria, or abnl vaginal discharge.   	  MEDICATIONS:              ferrous    sulfate 325 milliGRAM(s) Oral daily      PAST MEDICAL/SURGICAL HISTORY  PAST MEDICAL & SURGICAL HISTORY:  Anemia        SOCIAL HISTORY: Substance Use (street drugs): ( x ) never used  (  ) other:    FAMILY HISTORY:      REVIEW OF SYSTEMS:  CONSTITUTIONAL: No fever, weight loss, or fatigue  EYES: No eye pain, visual disturbances, or discharge  ENMT:  No difficulty hearing, tinnitus, vertigo; No sinus or throat pain  BREASTS: No pain, masses, or nipple discharge  GASTROINTESTINAL: No abdominal or epigastric pain. No nausea, vomiting, or hematemesis; No diarrhea or constipation. No melena or hematochezia.  GENITOURINARY: No dysuria, frequency, hematuria, or incontinence  NEUROLOGICAL: No headaches, memory loss, loss of strength, numbness, or tremors  ENDOCRINE: No heat or cold intolerance; No hair loss  MUSCULOSKELETAL: No joint pain or swelling; No muscle, back, or extremity pain  PSYCHIATRIC: No depression, anxiety, mood swings, or difficulty sleeping  HEME/LYMPH: No easy bruising, or bleeding gums  All others negative    PHYSICAL EXAM:  T(C): 36.3 (01-17-21 @ 04:05), Max: 38.7 (01-16-21 @ 21:40)  HR: 75 (01-17-21 @ 04:05) (71 - 101)  BP: 113/65 (01-17-21 @ 04:05) (106/55 - 141/65)  RR: 18 (01-17-21 @ 04:05) (16 - 18)  SpO2: 100% (01-17-21 @ 04:05) (100% - 100%)  Wt(kg): --  I&O's Summary        GENERAL: NAD  EYES: EOMI, PERRLA, conjunctiva and sclera clear  ENMT: No tonsillar erythema, exudates, or enlargement  Cardiovascular: Normal S1 S2, No JVD, No murmurs, No edema  Respiratory: Lungs clear to auscultation	  Gastrointestinal:  Soft, Non-tender, + BS	  Extremities: No edema                                      8.3    5.34  )-----------( 249      ( 17 Jan 2021 06:57 )             27.1     01-17    139  |  108<H>  |  10  ----------------------------<  102<H>  3.5   |  21<L>  |  0.73    Ca    8.1<L>      17 Jan 2021 06:57  Phos  3.1     01-17  Mg     1.9     01-17    TPro  6.2  /  Alb  3.6  /  TBili  0.6  /  DBili  x   /  AST  19  /  ALT  15  /  AlkPhos  58  01-17    proBNP:   Lipid Profile:   HgA1c:   TSH: Thyroid Stimulating Hormone, Serum: 2.57 uIU/mL (01-17 @ 06:57)      Consultant(s) Notes Reviewed:  [x ] YES  [ ] NO    Care Discussed with Consultants/Other Providers [ x] YES  [ ] NO    Imaging Personally Reviewed independently:  [x] YES  [ ] NO    All labs, radiologic studies, vitals, orders and medications list reviewed. Patient is seen and examined at bedside. Case discussed with medical team.

## 2021-01-17 NOTE — H&P ADULT - NSHPLABSRESULTS_GEN_ALL_CORE
Labs:                        7.6    10.30 )-----------( 308      ( 2021 14:42 )             25.7         139  |  104  |  13  ----------------------------<  120<H>  3.9   |  23  |  0.80    Ca    8.8      2021 14:42  Phos  4.0       Mg     1.7         TPro  6.8  /  Alb  3.9  /  TBili  <0.2  /  DBili  x   /  AST  22  /  ALT  15  /  AlkPhos  68      PT/INR: PT: 11.9 sec | INR: 1.05 ratio (21 @ 14:42)  PTT: 27.7 sec (21 @ 14:42)    Blood Gas Venous:  pH: 7.37 | HCO3: 22 | pCO2: 40 | pO2: 44 | Lactate: 1.3 (21 @ 14:42      Urinanalysis Basic (21 @ 04:02):  Color: Dark Brown / Appearance: Turbid / S.016 / pH: x  Gluc: x / Ketone: Trace / Bili: Negative / Urobili: <2 mg/dL  Blood: x / Protein: 100 mg/dL / Nitrite: Negative  Leuk Esterase: Small / RBC: >10 / WBC: 5-10  Sq Epi: x / Non Sq Epi: x / Bacteria: Few    CAPILLARY BLOOD GLUCOSE:  POCT Blood Glucose: 126 mg/dL (21 @ 12:33)      COVID-19/Full RVP Panel:  NotDetec (21 @ 18:19)    US Transvaginal : IMPRESSION:  Enlarged uterus with diffusely heterogeneous myometrium. Findings raise suspicion for adenomyosis.  Abnormally thickened endometrium.  Recommend nonemergent pelvic MRI to better evaluate the above findings due to the limited nature of this study.  Unable to visualize the ovaries.      Chest Xray:IMPRESSION: No focal consolidation. Labs:                        7.6    10.30 )-----------( 308      ( 2021 14:42 )             25.7         139  |  104  |  13  ----------------------------<  120<H>  3.9   |  23  |  0.80    Ca    8.8      2021 14:42  Phos  4.0       Mg     1.7         TPro  6.8  /  Alb  3.9  /  TBili  <0.2  /  DBili  x   /  AST  22  /  ALT  15  /  AlkPhos  68      PT/INR: PT: 11.9 sec | INR: 1.05 ratio (21 @ 14:42)  PTT: 27.7 sec (21 @ 14:42)    Blood Gas Venous:  pH: 7.37 | HCO3: 22 | pCO2: 40 | pO2: 44 | Lactate: 1.3 (21 @ 14:42      Urinanalysis Basic (21 @ 04:02):  Color: Dark Brown / Appearance: Turbid / S.016 / pH: x  Gluc: x / Ketone: Trace / Bili: Negative / Urobili: <2 mg/dL  Blood: x / Protein: 100 mg/dL / Nitrite: Negative  Leuk Esterase: Small / RBC: >10 / WBC: 5-10  Sq Epi: x / Non Sq Epi: x / Bacteria: Few    CAPILLARY BLOOD GLUCOSE:  POCT Blood Glucose: 126 mg/dL (21 @ 12:33)  `    COVID-19/Full RVP Panel:  NotDetec (21 @ 18:19)    US Transvaginal : IMPRESSION:  Enlarged uterus with diffusely heterogeneous myometrium. Findings raise suspicion for adenomyosis.  Abnormally thickened endometrium.  Recommend nonemergent pelvic MRI to better evaluate the above findings due to the limited nature of this study.  Unable to visualize the ovaries.    ECG: NSR with type II Brugada      Chest Xray:IMPRESSION: No focal consolidation.

## 2021-01-17 NOTE — H&P ADULT - NSHPPHYSICALEXAM_GEN_ALL_CORE
T(C): 36.5 (01-17-21 @ 01:30), Max: 38.7 (01-16-21 @ 21:40)  HR: 76 (01-17-21 @ 01:30) (71 - 101)  BP: 120/60 (01-17-21 @ 01:30) (106/55 - 141/65)  RR: 18 (01-17-21 @ 01:30) (16 - 18)  SpO2: 100% (01-17-21 @ 01:30) (100% - 100%)    Constitutional: NAD, well-developed, well-nourished  Ears, Nose, Mouth, and Throat: normal external ears and nose, normal hearing, moist oral mucosa  Eyes: pale conjunctiva, EOMI, PERRL  Neck: supple, no JVD  Respiratory: Clear to auscultation bilaterally. No wheezes, rales or rhonchi. Normal respiratory effort  Cardiovascular: RRR, no M/R/G, no edema, 2+ Peripheral Pulses  Gastrointestinal: soft, nontender, nondistended, +BS, no hernia  Skin: warm, dry, no rash  Neurologic: sensation grossly intact, CN grossly intact, non-focal exam  Musculoskeletal: no clubbing, no cyanosis, no joint swelling  Psychiatric: AOX3, appropriate mood, affect  Pelvic exam: Deferred, per GYN note. T(C): 36.5 (01-17-21 @ 01:30), Max: 38.7 (01-16-21 @ 21:40)  HR: 76 (01-17-21 @ 01:30) (71 - 101)  BP: 120/60 (01-17-21 @ 01:30) (106/55 - 141/65)  RR: 18 (01-17-21 @ 01:30) (16 - 18)  SpO2: 100% (01-17-21 @ 01:30) (100% - 100%)    Constitutional: NAD, well-developed, well-nourished  Ears, Nose, Mouth, and Throat: normal external ears and nose, normal hearing, moist oral mucosa  Eyes: pale conjunctiva, EOMI, PERRL  Neck: supple, no JVD  Respiratory: Clear to auscultation bilaterally. No wheezes, rales or rhonchi. Normal respiratory effort  Cardiovascular: RRR, no M/R/G, no edema, 2+ Peripheral Pulses  Gastrointestinal: soft, nontender, nondistended, +BS, no hernia  Skin: warm, dry, no rash  Neurologic: sensation grossly intact, CN grossly intact, non-focal exam  Musculoskeletal: no clubbing, no cyanosis, no joint swelling  Psychiatric: AOX3, appropriate mood, affect  Pelvic exam: Deferred, per GYN note. minimal bleeding on pad.  External labia wnl.   Speculum Exam: Small clot in vaginal vault, No active bleeding from os. Bimanual exam with no cervical motion tenderness, uterus approximately 13 week size, adnexa non palpable b/l.  Cervix closed

## 2021-01-17 NOTE — H&P ADULT - ASSESSMENT
51y/o Female with a significant PMHx of anemia presents to [facility] c/o intermittent vaginal bleeding since 12/26/20. Pt reports worsening bleeding over the past 2-3 days, soaking 5-6 pads per day. Pt is being admitted for Abnormal EKG , Vaginal Bleeding and Syncope  49y/o Female with a significant PMHx of anemia presents to [facility] c/o intermittent vaginal bleeding since 12/26/20. Pt reports worsening bleeding over the past 2-3 days, soaking 5-6 pads per day. Pt is being admitted for Abnormal EKG , Vaginal Bleeding and Syncope     - Transvagainl US c/w adenomyosis  - Seen by Gyn appreciate recs  - Out pt f/u Dr. Moreland to discuss definitive treatment   - monitor CBC, transfuse PRN     Fever  - Unclear cause  - COVID neg, CXR clear lungs. UA neg for infection.   - f/u BCx and UCx   - no leukocytosis, lactate unremarkable   - trend temps and monitor for further fevers    49y/o Female with a significant PMHx of anemia presents to [facility] c/o intermittent vaginal bleeding since 12/26/20. Pt reports worsening bleeding over the past 2-3 days, soaking 5-6 pads per day. Pt is being admitted for Abnormal EKG , Vaginal Bleeding and Syncope

## 2021-01-17 NOTE — H&P ADULT - PROBLEM SELECTOR PLAN 1
- Could be multifactorial in setting of anemia and cardiac arrythmia   - Found to have Brugada Type II on EKG   - Monitor on telemetry   - Seen by EP, recs appreciated   - TTE ordered   - s/p 1U for anemia w/ hbg 7.6, f/u repeat CBC and transfuse PRN

## 2021-01-17 NOTE — PATIENT PROFILE ADULT - CHOOSE INDICATION TO IMMUNIZE (AN ORDER WILL BE GENERATED WHEN THIS NOTE IS SAVED):
Speech Language Pathology  Speech Language Pathology  [facility]    Speech / Cognitive Treatment Note    Date: 2020  Patients Name: Keli Magaña  MRN: [id number]  Diagnosis:   Patient Active Problem List   Diagnosis Code    CL (cirrhosis of liver) (HCC) K74.60    Chronic hepatitis C without hepatic coma (HCC) B18.2    Thrombocytopenia (HCC) D69.6    Chronic obstructive pulmonary disease (HCC) J44.9    Anxiety F41.9    Malignant neoplasm of upper lobe of right lung (HCC) C34.11    Substance abuse ([facility] [address] F19.10    Diverticulitis of large intestine with perforation and abscess without bleeding K57.20    Malignant neoplasm of liver (HCC) C22.9    Chronic hepatitis C with hepatic coma ([facility] [address] B18.2    MVP (mitral valve prolapse) I34.1    On supplemental oxygen therapy Z99.81    History of radiation therapy Z92.3    Fibromyalgia M79.7    History of chemotherapy Z92.21    Bipolar disorder ([facility] [address] F31.9    Current every day smoker F17.200    AMS (altered mental status) R41.82    Pleural effusion on right J90    Hypokalemia E87.6    Depression F32.9    Transaminitis R74.0    Respiratory alkalosis E87.3    Elevated lactic acid level R79.89    Elevated myoglobin level R89.7    Diabetes mellitus type 2 in nonobese (HCC) E11.9    Encounter for palliative care Z51.5       Pain: denies    Cognitive Treatment    Treatment time: [id number]      Subjective: [x] Alert [x] Cooperative     [] Confused     [] Agitated    [] Lethargic      Objective/Assessment:  Attention: maintained throughout, distractions minimized    Orientation: Pt able to state her name and  independently. Pt required max verbal cues & verbal model to state age, place, situation, year, and month. Recall: Pt provided education re: visualization compensatory memory strategy. Pt verbalized understanding. Delayed recall, 3 Units: Pt presented 3 related words & instructed to utilize strategy to remember. 5-Minute Delay: 0/3, no increase despite max cues & encouragement as pt refused to answer further questions at this time. Immediate Recall, 3 Numbers: 4/5 independently, no increase despite repetition    Immediate Recall, 4 Numbers: 0/3, no increase despite repetition & cues    Problem Solving/Reasoning:   Inductive Reasonin/4, increased to 2/4 with max verbal cues. Pt refused to answer additional questions, and stated \"I've had enough. \"     Speech: Reviewed with pt compensatory speech strategies, \"increased vocal loudness\" and \"clear speech. \" Pt verbalized understanding. Pt presented 5-syllable phrases & instructed to repeat using strategies. Pt completed for 3/5, increased to 5/5 with mod verbal cue    Other: Session concluded as pt refused to answer additional questions. Plan:  [x] Continue ST services    [] Discharge from ST:      Discharge recommendations: Further therapy recommended at discharge. Treatment completed by:  Dewayne Wright M.S. [id number] [facility] Patient is not pregnant (male or female)

## 2021-01-17 NOTE — H&P ADULT - PROBLEM SELECTOR PLAN 5
1.  Name of PCP:   2.  PCP Contacted on Admission: [ ] Y    [x] N - admitted at night    3.  PCP contacted at Discharge: [ ] Y    [ ] N    [ ] N/A  4.  Post-Discharge Appointment Date and Location:  5.  Summary of Handoff given to PCP: - Seen by EP, appreciate recs  - Monitor on telemetry   - TTE ordered - Unclear cause  - COVID neg, CXR clear lungs. UA neg for infection.   - f/u BCx and UCx   - no leukocytosis, lactate unremarkable   - trend temps and monitor for further fevers

## 2021-01-17 NOTE — H&P ADULT - HISTORY OF PRESENT ILLNESS
50yoF with a significant PMHx of anemia presents to [facility] c/o intermittent vaginal bleeding since 12/26/20. Pt reports worsening bleeding over the past 2-3 days, soaking 5-6 pads per day. She reports similar episodes previously in November for which she was following up with her PCP/GYN, per pt no acute intervention at that time. Pt also endorses a syncopal episode at home earlier today while coming out of the shower, reports falling onto the floor. ROS +vaginal bleeding, syncope and fatigue. Pt denies HA, change in vision, dizziness, light-headedness, chest pain, SOB, palpitations, diaphoresis, FISHMAN, abdominal pain, nausea, vomiting or diarrhea. Denies pelvic pain, dysuria, hematuria, or abnl vaginal discharge.  49y/o Female with a significant PMHx of anemia presents to [facility] c/o intermittent vaginal bleeding since 12/26/20. Pt reports worsening bleeding over the past 2-3 days, soaking 5-6 pads per day. She reports similar episodes previously in November for which she was following up with her PCP/GYN, per pt no acute intervention at that time. Pt also endorses a syncopal episode at home earlier today while coming out of the shower, reports falling onto the floor. ROS +vaginal bleeding, syncope and fatigue. Pt denies HA, change in vision, dizziness, light-headedness, chest pain, SOB, palpitations, diaphoresis, FISHMAN, abdominal pain, nausea, vomiting or diarrhea. Denies pelvic pain, dysuria, hematuria, or abnl vaginal discharge.

## 2021-01-17 NOTE — H&P ADULT - PROBLEM SELECTOR PLAN 3
- - Transvaginal US c/w adenomyosis  - Seen by Gyn appreciate recs  - Out pt f/u Dr. Moreland to discuss definitive treatment   - monitor CBC, transfuse PRN - Seen by EP, appreciate recs  - ECG: NSR with type II Brugada    - Monitor on telemetry   - TTE ordered

## 2021-01-17 NOTE — CONSULT NOTE ADULT - ASSESSMENT
Assessment and Plan    51y/o Female with a significant PMHx of anemia presents to [facility] with syncope    EKG: NSR with type II Brugada  Tele: NSR    1. Syncope  -likely secondary to symptomatic anemia from vaginal bleeding  -hemoglobin improved s/p PRBC transfusion  -brugada noted on EKG. EP following  -echo pending  -continue to monitor on tele    2. Anemia  -improving s/p PRBC transfusion  -secondary to vaginal bleeding  -f/u GYN         Assessment and Plan    49y/o Female with a significant PMHx of anemia presents to [facility] with syncope    EKG: NSR with type II Brugada  Tele: NSR    1. Syncope  -likely secondary to symptomatic anemia from vaginal bleeding  -hemoglobin improved s/p PRBC transfusion  -brugada type 2 noted on EKG likely incidental finding. EP following  -echo pending  -continue to monitor on tele    2. Anemia  -improving s/p PRBC transfusion  -secondary to vaginal bleeding  -f/u GYN

## 2021-01-17 NOTE — H&P ADULT - PROBLEM SELECTOR PLAN 4
DVT ppx: Venodyne - Unclear cause  - COVID neg, CXR clear lungs. UA neg for infection.   - f/u BCx and UCx   - no leukocytosis, lactate unremarkable   - trend temps and monitor for further fevers - Likely 2/2 to vaginal bleeding   - GYN recs appreciated   - Anemia w/u ordered for AM  - monitor CBC, and transfuse PRN   - Pt is receiving 2 units of PRBC , please do post transfusion CBC 11 AM   - keep active T&S - Likely 2/2 to vaginal bleeding   - GYN recs appreciated   - Anemia w/u ordered for AM  - monitor CBC, and transfuse PRN   - keep active T&S

## 2021-01-17 NOTE — ED ADULT NURSE REASSESSMENT NOTE - NS ED NURSE REASSESS COMMENT FT1
Transfusion completed at 0045, pt displays no signs of transfusion reaction symptoms at this time. 20G iV to right wrist has no signs of infiltration. Vitals are stable. Pt resting comfortably, denies any acute complaints. Report given to KIRA Garcia, pt awaiting transport.

## 2021-01-17 NOTE — H&P ADULT - NSHPREVIEWOFSYSTEMS_GEN_ALL_CORE
Constitutional Symptoms: No weakness, fevers, chills, weight loss  Eyes: No visual changes, headache, eye pain, double vision  Ears, Nose, Mouth, Throat: No runny nose, sinus pain, ear pain, tinnitus, sore throat, dysphagia, odynophagia  Cardiovascular: No chest pain, palpitations, edema  Respiratory: No cough, wheezing, hemoptysis, shortness of breath  Gastrointestinal: No abdominal pain, dysphagia, anorexia, nausea/vomiting, diarrhea/constipation, hematemesis, BRBPR, melena  Genitourinary: +Vaginal bleeding, no dysuria, frequency, hematuria  Musculoskeletal: No joint pain, joint swelling, decreased ROM  Skin: No pruritus, rashes, lesions, wounds  Neurologic: +Syncope, no seizures, headache, paraesthesia, numbness, limb weakness    Positives and pertinent negatives noted and all other systems negative.

## 2021-01-17 NOTE — H&P ADULT - NSHPSOCIALHISTORY_GEN_ALL_CORE
Tobacco Usage:  (x ) never smoked   ( ) former smoker  ( ) current smoker; Packs per day:   Alcohol Usage: (x ) none  ( ) occasional ( ) 2-3 times a week ( ) daily; Last drink:   Recreational drugs (x ) None  Lives with family  Denies Recent travel ... Tobacco Usage:  (x ) never smoked   ( ) former smoker  ( ) current smoker; Packs per day:   Alcohol Usage: (x ) none  ( ) occasional ( ) 2-3 times a week ( ) daily; Last drink:   Recreational drugs (x ) None  Lives with family  Denies Recent travel

## 2021-01-18 LAB
ALBUMIN SERPL ELPH-MCNC: 3.6 G/DL — SIGNIFICANT CHANGE UP (ref 3.3–5)
ALP SERPL-CCNC: 56 U/L — SIGNIFICANT CHANGE UP (ref 40–120)
ALT FLD-CCNC: 17 U/L — SIGNIFICANT CHANGE UP (ref 4–33)
ANION GAP SERPL CALC-SCNC: 9 MMOL/L — SIGNIFICANT CHANGE UP (ref 7–14)
AST SERPL-CCNC: 24 U/L — SIGNIFICANT CHANGE UP (ref 4–32)
BASOPHILS # BLD AUTO: 0.06 K/UL — SIGNIFICANT CHANGE UP (ref 0–0.2)
BASOPHILS NFR BLD AUTO: 1 % — SIGNIFICANT CHANGE UP (ref 0–2)
BILIRUB SERPL-MCNC: 0.2 MG/DL — SIGNIFICANT CHANGE UP (ref 0.2–1.2)
BUN SERPL-MCNC: 12 MG/DL — SIGNIFICANT CHANGE UP (ref 7–23)
CALCIUM SERPL-MCNC: 8.2 MG/DL — LOW (ref 8.4–10.5)
CHLORIDE SERPL-SCNC: 108 MMOL/L — HIGH (ref 98–107)
CO2 SERPL-SCNC: 23 MMOL/L — SIGNIFICANT CHANGE UP (ref 22–31)
CREAT SERPL-MCNC: 0.8 MG/DL — SIGNIFICANT CHANGE UP (ref 0.5–1.3)
CULTURE RESULTS: SIGNIFICANT CHANGE UP
EOSINOPHIL # BLD AUTO: 0.13 K/UL — SIGNIFICANT CHANGE UP (ref 0–0.5)
EOSINOPHIL NFR BLD AUTO: 2.1 % — SIGNIFICANT CHANGE UP (ref 0–6)
GLUCOSE SERPL-MCNC: 93 MG/DL — SIGNIFICANT CHANGE UP (ref 70–99)
HCT VFR BLD CALC: 26.6 % — LOW (ref 34.5–45)
HCT VFR BLD CALC: 27 % — LOW (ref 34.5–45)
HGB BLD-MCNC: 7.9 G/DL — LOW (ref 11.5–15.5)
HGB BLD-MCNC: 8 G/DL — LOW (ref 11.5–15.5)
IANC: 3.46 K/UL — SIGNIFICANT CHANGE UP (ref 1.5–8.5)
IMM GRANULOCYTES NFR BLD AUTO: 1.3 % — SIGNIFICANT CHANGE UP (ref 0–1.5)
IRON SATN MFR SERPL: 22 UG/DL — LOW (ref 30–160)
IRON SATN MFR SERPL: 7 % — LOW (ref 14–50)
LYMPHOCYTES # BLD AUTO: 1.74 K/UL — SIGNIFICANT CHANGE UP (ref 1–3.3)
LYMPHOCYTES # BLD AUTO: 28.2 % — SIGNIFICANT CHANGE UP (ref 13–44)
MCHC RBC-ENTMCNC: 23.1 PG — LOW (ref 27–34)
MCHC RBC-ENTMCNC: 23.3 PG — LOW (ref 27–34)
MCHC RBC-ENTMCNC: 29.3 GM/DL — LOW (ref 32–36)
MCHC RBC-ENTMCNC: 30.1 GM/DL — LOW (ref 32–36)
MCV RBC AUTO: 77.3 FL — LOW (ref 80–100)
MCV RBC AUTO: 78.9 FL — LOW (ref 80–100)
MONOCYTES # BLD AUTO: 0.7 K/UL — SIGNIFICANT CHANGE UP (ref 0–0.9)
MONOCYTES NFR BLD AUTO: 11.3 % — SIGNIFICANT CHANGE UP (ref 2–14)
NEUTROPHILS # BLD AUTO: 3.46 K/UL — SIGNIFICANT CHANGE UP (ref 1.8–7.4)
NEUTROPHILS NFR BLD AUTO: 56.1 % — SIGNIFICANT CHANGE UP (ref 43–77)
NRBC # BLD: 0 /100 WBCS — SIGNIFICANT CHANGE UP
NRBC # BLD: 0 /100 WBCS — SIGNIFICANT CHANGE UP
NRBC # FLD: 0 K/UL — SIGNIFICANT CHANGE UP
NRBC # FLD: 0.02 K/UL — HIGH
PLATELET # BLD AUTO: 248 K/UL — SIGNIFICANT CHANGE UP (ref 150–400)
PLATELET # BLD AUTO: 279 K/UL — SIGNIFICANT CHANGE UP (ref 150–400)
POTASSIUM SERPL-MCNC: 3.6 MMOL/L — SIGNIFICANT CHANGE UP (ref 3.5–5.3)
POTASSIUM SERPL-SCNC: 3.6 MMOL/L — SIGNIFICANT CHANGE UP (ref 3.5–5.3)
PROT SERPL-MCNC: 6.2 G/DL — SIGNIFICANT CHANGE UP (ref 6–8.3)
RBC # BLD: 3.42 M/UL — LOW (ref 3.8–5.2)
RBC # BLD: 3.44 M/UL — LOW (ref 3.8–5.2)
RBC # FLD: 16 % — HIGH (ref 10.3–14.5)
RBC # FLD: 16.2 % — HIGH (ref 10.3–14.5)
SODIUM SERPL-SCNC: 140 MMOL/L — SIGNIFICANT CHANGE UP (ref 135–145)
SPECIMEN SOURCE: SIGNIFICANT CHANGE UP
TIBC SERPL-MCNC: 315 UG/DL — SIGNIFICANT CHANGE UP (ref 220–430)
UIBC SERPL-MCNC: 293 UG/DL — SIGNIFICANT CHANGE UP (ref 110–370)
WBC # BLD: 5.59 K/UL — SIGNIFICANT CHANGE UP (ref 3.8–10.5)
WBC # BLD: 6.17 K/UL — SIGNIFICANT CHANGE UP (ref 3.8–10.5)
WBC # FLD AUTO: 5.59 K/UL — SIGNIFICANT CHANGE UP (ref 3.8–10.5)
WBC # FLD AUTO: 6.17 K/UL — SIGNIFICANT CHANGE UP (ref 3.8–10.5)

## 2021-01-18 RX ORDER — POTASSIUM CHLORIDE 20 MEQ
40 PACKET (EA) ORAL ONCE
Refills: 0 | Status: COMPLETED | OUTPATIENT
Start: 2021-01-18 | End: 2021-01-18

## 2021-01-18 RX ORDER — ACETAMINOPHEN 500 MG
650 TABLET ORAL EVERY 6 HOURS
Refills: 0 | Status: DISCONTINUED | OUTPATIENT
Start: 2021-01-18 | End: 2021-01-20

## 2021-01-18 RX ADMIN — Medication 40 MILLIEQUIVALENT(S): at 10:04

## 2021-01-18 RX ADMIN — Medication 650 MILLIGRAM(S): at 16:12

## 2021-01-18 RX ADMIN — Medication 325 MILLIGRAM(S): at 10:04

## 2021-01-18 NOTE — DISCHARGE NOTE PROVIDER - CARE PROVIDER_API CALL
SOFIA GARCIA  Internal Medicine  [address] [place] [address]  Phone: [phone]  Fax: [phone]  Follow Up Time:    SOFIA GARCIA  Internal Medicine  [address] [place] [address]  Phone: [phone]  Fax: [phone]  Follow Up Time:     Sofia Nicole)  Cardiovascular Disease; Internal Medicine  [address] [place] [address]  Phone: [phone]  Fax: [phone]  Follow Up Time:    SOFIA GARCIA  Internal Medicine  [address] [place] [address]  Phone: [phone]  Fax: [phone]  Established Patient  Follow Up Time: 1-3 days    Sofia Nicole)  Cardiovascular Disease; Internal Medicine  [address] [place] [address]  Phone: [phone]  Fax: [phone]  Follow Up Time:

## 2021-01-18 NOTE — PROGRESS NOTE ADULT - SUBJECTIVE AND OBJECTIVE BOX
Bradford Nicole MD  Interventional Cardiology / Endovascular Specialist  [place] Office : [phone] [place] [address]. [address]  Tel:   [place] Office : [address] [facility] [address] [address]  Tel: [phone]  Cell : [phone]    Subjective/Overnight events: Patient seen lying in bed. Denies chest pain, SOB or palpitations  	  MEDICATIONS:              ferrous    sulfate 325 milliGRAM(s) Oral daily      PAST MEDICAL/SURGICAL HISTORY  PAST MEDICAL & SURGICAL HISTORY:  Anemia        SOCIAL HISTORY: Substance Use (street drugs): ( x ) never used  (  ) other:    FAMILY HISTORY:      REVIEW OF SYSTEMS:  CONSTITUTIONAL: No fever, weight loss, or fatigue  EYES: No eye pain, visual disturbances, or discharge  ENMT:  No difficulty hearing, tinnitus, vertigo; No sinus or throat pain  BREASTS: No pain, masses, or nipple discharge  GASTROINTESTINAL: No abdominal or epigastric pain. No nausea, vomiting, or hematemesis; No diarrhea or constipation. No melena or hematochezia.  GENITOURINARY: No dysuria, frequency, hematuria, or incontinence  NEUROLOGICAL: No headaches, memory loss, loss of strength, numbness, or tremors  ENDOCRINE: No heat or cold intolerance; No hair loss  MUSCULOSKELETAL: No joint pain or swelling; No muscle, back, or extremity pain  PSYCHIATRIC: No depression, anxiety, mood swings, or difficulty sleeping  HEME/LYMPH: No easy bruising, or bleeding gums  All others negative    PHYSICAL EXAM:  T(C): 36.8 (01-18-21 @ 09:20), Max: 37 (01-17-21 @ 15:31)  HR: 63 (01-18-21 @ 09:20) (63 - 94)  BP: 130/63 (01-18-21 @ 09:20) (105/60 - 130/63)  RR: 17 (01-18-21 @ 09:20) (17 - 18)  SpO2: 100% (01-18-21 @ 09:20) (98% - 100%)  Wt(kg): --  I&O's Summary        GENERAL: NAD  EYES: EOMI, PERRLA, conjunctiva and sclera clear  ENMT: No tonsillar erythema, exudates, or enlargement  Cardiovascular: Normal S1 S2, No JVD, No murmurs, No edema  Respiratory: Lungs clear to auscultation	  Gastrointestinal:  Soft, Non-tender, + BS	  Extremities: No edema                                  8.0    5.59  )-----------( 248      ( 18 Jan 2021 06:27 )             26.6     01-18    140  |  108<H>  |  12  ----------------------------<  93  3.6   |  23  |  0.80    Ca    8.2<L>      18 Jan 2021 06:27  Phos  3.1     01-17  Mg     1.9     01-17    TPro  6.2  /  Alb  3.6  /  TBili  0.2  /  DBili  x   /  AST  24  /  ALT  17  /  AlkPhos  56  01-18    proBNP:   Lipid Profile:   HgA1c:   TSH:     Consultant(s) Notes Reviewed:  [x ] YES  [ ] NO    Care Discussed with Consultants/Other Providers [ x] YES  [ ] NO    Imaging Personally Reviewed independently:  [x] YES  [ ] NO    All labs, radiologic studies, vitals, orders and medications list reviewed. Patient is seen and examined at bedside. Case discussed with medical team.

## 2021-01-18 NOTE — PROGRESS NOTE ADULT - ASSESSMENT
Assessment and Plan    51y/o Female with a significant PMHx of anemia presents to [facility] with syncope    EKG: NSR with type II Brugada  Tele: NSR    1. Syncope  -likely secondary to symptomatic anemia from vaginal bleeding  -hemoglobin improved s/p PRBC transfusion  -brugada type 2 noted on EKG likely incidental finding. EP following  -echo with normal LV function   -continue to monitor on tele    2. Anemia  -improving s/p PRBC transfusion  -secondary to vaginal bleeding  -f/u GYN Assessment and Plan    49y/o Female with a significant PMHx of anemia presents to [facility] with syncope    EKG: NSR with type II Brugada  Tele: NSR    1. Syncope  -likely secondary to symptomatic anemia from vaginal bleeding  -hemoglobin improved s/p PRBC transfusion  -brugada type 2 noted on EKG likely incidental finding. EP following  -echo with normal LV function   -continue to monitor on tele    2. Anemia  -improving s/p PRBC transfusion  -secondary to vaginal bleeding  -f/u GYN    Patient has outpatient tele appointment scheduled for January 26th at 130pm

## 2021-01-18 NOTE — DISCHARGE NOTE PROVIDER - PROVIDER TOKENS
PROVIDER:[TOKEN:[[id number]]] PROVIDER:[TOKEN:[[id number]]],PROVIDER:[TOKEN:[[id number]]] PROVIDER:[TOKEN:[[id number]],FOLLOWUP:[1-3 days],ESTABLISHEDPATIENT:[T]],PROVIDER:[TOKEN:[[id number]]]

## 2021-01-18 NOTE — PROGRESS NOTE ADULT - SUBJECTIVE AND OBJECTIVE BOX
Patient is a 50y old  Female who presents with a chief complaint of Vaginal Bleeding and Syncope (18 Jan 2021 13:07)    Date of servie : 01-18-21 @ 19:23  INTERVAL HPI/OVERNIGHT EVENTS:  T(C): 36.8 (01-18-21 @ 13:49), Max: 36.8 (01-18-21 @ 09:20)  HR: 84 (01-18-21 @ 13:49) (63 - 84)  BP: 116/70 (01-18-21 @ 13:49) (105/60 - 130/63)  RR: 16 (01-18-21 @ 13:49) (16 - 18)  SpO2: 100% (01-18-21 @ 13:49) (98% - 100%)  Wt(kg): --  I&O's Summary      LABS:                        7.9    6.17  )-----------( 279      ( 18 Jan 2021 15:30 )             27.0     01-18    140  |  108<H>  |  12  ----------------------------<  93  3.6   |  23  |  0.80    Ca    8.2<L>      18 Jan 2021 06:27  Phos  3.1     01-17  Mg     1.9     01-17    TPro  6.2  /  Alb  3.6  /  TBili  0.2  /  DBili  x   /  AST  24  /  ALT  17  /  AlkPhos  56  01-18        CAPILLARY BLOOD GLUCOSE                MEDICATIONS  (STANDING):  ferrous    sulfate 325 milliGRAM(s) Oral daily    MEDICATIONS  (PRN):  acetaminophen   Tablet .. 650 milliGRAM(s) Oral every 6 hours PRN Moderate Pain (4 - 6), Severe Pain (7 - 10)         Patient is a 50y old  Female who presents with a chief complaint of Vaginal Bleeding and Syncope (18 Jan 2021 13:07)    Date of servie : 01-18-21 @ 19:23  INTERVAL HPI/OVERNIGHT EVENTS:  T(C): 36.8 (01-18-21 @ 13:49), Max: 36.8 (01-18-21 @ 09:20)  HR: 84 (01-18-21 @ 13:49) (63 - 84)  BP: 116/70 (01-18-21 @ 13:49) (105/60 - 130/63)  RR: 16 (01-18-21 @ 13:49) (16 - 18)  SpO2: 100% (01-18-21 @ 13:49) (98% - 100%)  Wt(kg): --  I&O's Summary      LABS:                        7.9    6.17  )-----------( 279      ( 18 Jan 2021 15:30 )             27.0     01-18    140  |  108<H>  |  12  ----------------------------<  93  3.6   |  23  |  0.80    Ca    8.2<L>      18 Jan 2021 06:27  Phos  3.1     01-17  Mg     1.9     01-17    TPro  6.2  /  Alb  3.6  /  TBili  0.2  /  DBili  x   /  AST  24  /  ALT  17  /  AlkPhos  56  01-18        CAPILLARY BLOOD GLUCOSE                MEDICATIONS  (STANDING):  ferrous    sulfate 325 milliGRAM(s) Oral daily    MEDICATIONS  (PRN):  acetaminophen   Tablet .. 650 milliGRAM(s) Oral every 6 hours PRN Moderate Pain (4 - 6), Severe Pain (7 - 10)        General: WN/WD NAD  PERRLA  Neurology: A&Ox3, nonfocal, MCKEON x 4  Respiratory: CTA B/L  CV: RRR, S1S2, no murmurs, rubs or gallops  Abdominal: Soft, NT, ND +BS, Last BM  Extremities: No edema, + peripheral pulses  Skin Normal

## 2021-01-18 NOTE — PROGRESS NOTE ADULT - ASSESSMENT
Problem/Plan - 1:  ·  Problem: Syncope.  Plan: - Could be multifactorial in setting of anemia and cardiac arrythmia   - Found to have Brugada Type II on EKG   - Monitor on telemetry   - Seen by EP, recs appreciated   - TTE ordered   - s/p 1U for anemia w/ hbg 7.6, f/u repeat CBC and transfuse PRN.     Problem/Plan - 2:·  Problem: Vaginal bleeding.  Plan: - Transvaginal US c/w adenomyosis  - Seen by Gyn appreciate recs  - Out pt f/u Dr. Moreland to discuss definitive treatment   - monitor CBC, transfuse PRN.     Problem/Plan - 3:  ·  Problem: Brugada syndrome.  Plan: - Seen by EP, appreciate recs  - ECG: NSR with type II Brugada    - Monitor on telemetry   - TTE ordered.     Problem/Plan - 4:  ·  Problem: Symptomatic anemia.  Plan: - Likely 2/2 to vaginal bleeding   - GYN recs appreciated   - Anemia w/u ordered for AM  - monitor CBC, and transfuse PRN   - keep active T&S.     Problem/Plan - 5:  ·  Problem: Fever.  Plan: - Unclear cause  - COVID neg, CXR clear lungs. UA neg for infection.   - f/u BCx and UCx   - no leukocytosis, lactate unremarkable   - trend temps and monitor for further fevers.     Problem/Plan - 6:  Problem: DVT prophylaxis. Plan: DVT ppx: Venodyne.

## 2021-01-18 NOTE — DISCHARGE NOTE PROVIDER - HOSPITAL COURSE
51y/o Female with a significant PMHx of anemia presents to [facility] c/o intermittent vaginal bleeding since 12/26/20. Pt reports worsening bleeding over the past 2-3 days, soaking 5-6 pads per day. Pt is being admitted for Abnormal EKG , Vaginal Bleeding and Syncope     ·  Syncope.  Plan: - Could be multifactorial in setting of anemia and cardiac arrythmia   - Found to have Brugada Type II on EKG   - Monitor on telemetry   - Seen by EP, recs appreciated   - TTE: Normal left ventricular systolic function. No segmental  wall motion abnormalities.  2. Normal right ventricular size and function.    - s/p 1U for anemia w/ hbg 7.6, f/u repeat CBC and transfuse PRN.     ·  Vaginal bleeding.  Plan: - Transvaginal US c/w adenomyosis  - Seen by Gyn appreciate recs  - Out pt f/u Dr. Moreland to discuss definitive treatment   - monitor CBC, transfuse PRN.     ·  Brugada syndrome.  Plan: - Seen by EP, appreciate recs  - ECG: NSR with type II Brugada    - Monitor on telemetry     ·  Symptomatic anemia.  Plan: - Likely 2/2 to vaginal bleeding   - GYN recs appreciated   - Anemia w/u ordered for AM  - monitor CBC, and transfuse PRN   - keep active T&S.     ·  Fever.  Plan: - Unclear cause  - COVID neg, CXR clear lungs. UA neg for infection.   - f/u BCx and UCx   - no leukocytosis, lactate unremarkable   - trend temps and monitor for further fevers.      51y/o Female with a significant PMHx of anemia presents to [facility] c/o intermittent vaginal bleeding since 12/26/20. Pt reports worsening bleeding over the past 2-3 days, soaking 5-6 pads per day. Pt is being admitted for Abnormal EKG , Vaginal Bleeding and Syncope     ·  Syncope.  Plan: - Could be multifactorial in setting of anemia and cardiac arrythmia   - Found to have Brugada Type II on EKG   - Monitor on telemetry   - Seen by EP, recs appreciated   - TTE: Normal left ventricular systolic function. No segmental  wall motion abnormalities.  2. Normal right ventricular size and function.    - s/p 1U for anemia w/ hbg 7.6, f/u repeat CBC and transfuse PRN.     ·  Vaginal bleeding.  Plan: - Transvaginal US c/w adenomyosis  - Seen by Gyn appreciate recs  - Out pt f/u Dr. Moreland to discuss definitive treatment   - monitor CBC, transfuse PRN.     ·  Brugada syndrome.  Plan: - Seen by EP, appreciate recs  - ECG: NSR with type II Brugada    - Monitor on telemetry     ·  Symptomatic anemia.  Plan: - Likely 2/2 to vaginal bleeding   - GYN recs appreciated   - Anemia w/u ordered for AM  - monitor CBC, and transfuse PRN   - keep active T&S.     ·  Fever.  Plan: - Unclear cause  - COVID neg, CXR clear lungs. UA neg for infection.   - f/u BCx and UCx   - no leukocytosis, lactate unremarkable   - trend temps and monitor for further fevers.     As per attending, patient stable for discharge.  Followup with private GYN as outpatient Dr Franklin.

## 2021-01-18 NOTE — DISCHARGE NOTE PROVIDER - NSDCMRMEDTOKEN_GEN_ALL_CORE_FT
FERROUS SULF  MG TABLET: TAKE 1 TABLET BY MOUTH EVERY DAY   acetaminophen 325 mg oral tablet: 2 tab(s) orally every 6 hours, As needed, Moderate Pain (4 - 6), Severe Pain (7 - 10)  FERROUS SULF  MG TABLET: TAKE 1 TABLET BY MOUTH EVERY DAY

## 2021-01-19 LAB
ANION GAP SERPL CALC-SCNC: 10 MMOL/L — SIGNIFICANT CHANGE UP (ref 7–14)
BASOPHILS # BLD AUTO: 0.06 K/UL — SIGNIFICANT CHANGE UP (ref 0–0.2)
BASOPHILS # BLD AUTO: 0.06 K/UL — SIGNIFICANT CHANGE UP (ref 0–0.2)
BASOPHILS NFR BLD AUTO: 0.7 % — SIGNIFICANT CHANGE UP (ref 0–2)
BASOPHILS NFR BLD AUTO: 0.9 % — SIGNIFICANT CHANGE UP (ref 0–2)
BUN SERPL-MCNC: 16 MG/DL — SIGNIFICANT CHANGE UP (ref 7–23)
CALCIUM SERPL-MCNC: 8.6 MG/DL — SIGNIFICANT CHANGE UP (ref 8.4–10.5)
CHLORIDE SERPL-SCNC: 106 MMOL/L — SIGNIFICANT CHANGE UP (ref 98–107)
CO2 SERPL-SCNC: 23 MMOL/L — SIGNIFICANT CHANGE UP (ref 22–31)
CREAT SERPL-MCNC: 0.73 MG/DL — SIGNIFICANT CHANGE UP (ref 0.5–1.3)
EOSINOPHIL # BLD AUTO: 0.15 K/UL — SIGNIFICANT CHANGE UP (ref 0–0.5)
EOSINOPHIL # BLD AUTO: 0.18 K/UL — SIGNIFICANT CHANGE UP (ref 0–0.5)
EOSINOPHIL NFR BLD AUTO: 2.1 % — SIGNIFICANT CHANGE UP (ref 0–6)
EOSINOPHIL NFR BLD AUTO: 2.1 % — SIGNIFICANT CHANGE UP (ref 0–6)
GLUCOSE SERPL-MCNC: 95 MG/DL — SIGNIFICANT CHANGE UP (ref 70–99)
HCT VFR BLD CALC: 24.6 % — LOW (ref 34.5–45)
HCT VFR BLD CALC: 25.3 % — LOW (ref 34.5–45)
HGB BLD-MCNC: 7.6 G/DL — LOW (ref 11.5–15.5)
HGB BLD-MCNC: 7.7 G/DL — LOW (ref 11.5–15.5)
IANC: 4.47 K/UL — SIGNIFICANT CHANGE UP (ref 1.5–8.5)
IANC: 5.35 K/UL — SIGNIFICANT CHANGE UP (ref 1.5–8.5)
IMM GRANULOCYTES NFR BLD AUTO: 2.1 % — HIGH (ref 0–1.5)
IMM GRANULOCYTES NFR BLD AUTO: 2.5 % — HIGH (ref 0–1.5)
LYMPHOCYTES # BLD AUTO: 1.58 K/UL — SIGNIFICANT CHANGE UP (ref 1–3.3)
LYMPHOCYTES # BLD AUTO: 2.09 K/UL — SIGNIFICANT CHANGE UP (ref 1–3.3)
LYMPHOCYTES # BLD AUTO: 22.6 % — SIGNIFICANT CHANGE UP (ref 13–44)
LYMPHOCYTES # BLD AUTO: 24.5 % — SIGNIFICANT CHANGE UP (ref 13–44)
MCHC RBC-ENTMCNC: 23.7 PG — LOW (ref 27–34)
MCHC RBC-ENTMCNC: 24 PG — LOW (ref 27–34)
MCHC RBC-ENTMCNC: 30 GM/DL — LOW (ref 32–36)
MCHC RBC-ENTMCNC: 31.3 GM/DL — LOW (ref 32–36)
MCV RBC AUTO: 76.6 FL — LOW (ref 80–100)
MCV RBC AUTO: 78.8 FL — LOW (ref 80–100)
MONOCYTES # BLD AUTO: 0.58 K/UL — SIGNIFICANT CHANGE UP (ref 0–0.9)
MONOCYTES # BLD AUTO: 0.65 K/UL — SIGNIFICANT CHANGE UP (ref 0–0.9)
MONOCYTES NFR BLD AUTO: 7.6 % — SIGNIFICANT CHANGE UP (ref 2–14)
MONOCYTES NFR BLD AUTO: 8.3 % — SIGNIFICANT CHANGE UP (ref 2–14)
NEUTROPHILS # BLD AUTO: 4.47 K/UL — SIGNIFICANT CHANGE UP (ref 1.8–7.4)
NEUTROPHILS # BLD AUTO: 5.35 K/UL — SIGNIFICANT CHANGE UP (ref 1.8–7.4)
NEUTROPHILS NFR BLD AUTO: 62.6 % — SIGNIFICANT CHANGE UP (ref 43–77)
NEUTROPHILS NFR BLD AUTO: 64 % — SIGNIFICANT CHANGE UP (ref 43–77)
NRBC # BLD: 0 /100 WBCS — SIGNIFICANT CHANGE UP
NRBC # BLD: 0 /100 WBCS — SIGNIFICANT CHANGE UP
NRBC # FLD: 0.03 K/UL — HIGH
NRBC # FLD: 0.05 K/UL — HIGH
PLATELET # BLD AUTO: 264 K/UL — SIGNIFICANT CHANGE UP (ref 150–400)
PLATELET # BLD AUTO: 307 K/UL — SIGNIFICANT CHANGE UP (ref 150–400)
POTASSIUM SERPL-MCNC: 4.2 MMOL/L — SIGNIFICANT CHANGE UP (ref 3.5–5.3)
POTASSIUM SERPL-SCNC: 4.2 MMOL/L — SIGNIFICANT CHANGE UP (ref 3.5–5.3)
RBC # BLD: 3.21 M/UL — LOW (ref 3.8–5.2)
RBC # BLD: 3.21 M/UL — LOW (ref 3.8–5.2)
RBC # FLD: 16.1 % — HIGH (ref 10.3–14.5)
RBC # FLD: 16.3 % — HIGH (ref 10.3–14.5)
SODIUM SERPL-SCNC: 139 MMOL/L — SIGNIFICANT CHANGE UP (ref 135–145)
WBC # BLD: 6.99 K/UL — SIGNIFICANT CHANGE UP (ref 3.8–10.5)
WBC # BLD: 8.54 K/UL — SIGNIFICANT CHANGE UP (ref 3.8–10.5)
WBC # FLD AUTO: 6.99 K/UL — SIGNIFICANT CHANGE UP (ref 3.8–10.5)
WBC # FLD AUTO: 8.54 K/UL — SIGNIFICANT CHANGE UP (ref 3.8–10.5)

## 2021-01-19 PROCEDURE — 99233 SBSQ HOSP IP/OBS HIGH 50: CPT

## 2021-01-19 RX ADMIN — Medication 325 MILLIGRAM(S): at 11:53

## 2021-01-19 NOTE — PROGRESS NOTE ADULT - SUBJECTIVE AND OBJECTIVE BOX
Patient seen and examined at bedside.    Overnight Events: No acute events overnight, tele remains sinus, asymptomatic.     Review Of Systems: No chest pain, shortness of breath, or palpitations            Current Meds:  acetaminophen   Tablet .. 650 milliGRAM(s) Oral every 6 hours PRN  ferrous    sulfate 325 milliGRAM(s) Oral daily      Vitals:  T(F): 97.8 (01-19), Max: 98.5 (01-18)  HR: 86 (01-19) (72 - 86)  BP: 120/68 (01-19) (96/61 - 120/68)  RR: 18 (01-19)  SpO2: 100% (01-19)  I&O's Summary      Physical Exam:  Appearance: No acute distress; well appearing  Cardiovascular: RRR, S1, S2, no murmurs, rubs, or gallops; no edema; no JVD  Respiratory: Clear to auscultation bilaterally  Psychiatry: AAOx3, mood & affect appropriate                   7.7    6.99  )-----------( 264      ( 19 Jan 2021 07:42 )             24.6     01-19    139  |  106  |  16  ----------------------------<  95  4.2   |  23  |  0.73    Ca    8.6      19 Jan 2021 07:42    TPro  6.2  /  Alb  3.6  /  TBili  0.2  /  DBili  x   /  AST  24  /  ALT  17  /  AlkPhos  56  01-18    EKG: No new EKG     Echo  CONCLUSIONS:  1. Normal left ventricular systolic function. No segmental  wall motion abnormalities.  2. Normal right ventricular size and function.  *** No previous Echo exam.    Interpretation of Telemetry: sinus/sinus tach    49 yo F with a pmhx of anemia presents to the ED with vaginal bleeding complicated by syncopal episode, found to have significant anemia, now stabilized, EKG notable for possible brugada pattern, no clear clinical manifestations.     - remains stable from a cardiac perspective   - lower risk given no family history of sudden cardiac death   - TTE unremarkable   - If patient has fever, please treat with Tylenol immediately  - Please avoid these medications. See [email or web address]

## 2021-01-19 NOTE — PROGRESS NOTE ADULT - ASSESSMENT
Assessment and Plan    49y/o Female with a significant PMHx of anemia presents to [facility] with syncope    EKG: NSR with type II Brugada  Tele: NSR with episode of ST 130s    1. Syncope  -likely secondary to symptomatic anemia from vaginal bleeding  -hemoglobin improved s/p PRBC transfusion  -brugada type 2 noted on EKG likely incidental finding. EP following  -echo with normal LV function   -continue to monitor on tele  -noted to be tachy to 130s this morning, sinus tach, patient was walking to bathroom at that time with increased vaginal bleeding. denies chest pain, palpitations, SOB, dizziness or lightheadedness     2. Anemia  -improving s/p PRBC transfusion  -secondary to vaginal bleeding  -f/u GYN    Patient has outpatient tele appointment scheduled for January 26th at 130pm

## 2021-01-19 NOTE — PROGRESS NOTE ADULT - ASSESSMENT
Problem/Plan - 1:  ·  Problem: Syncope.  Plan: - Could be multifactorial in setting of anemia and cardiac arrythmia   - Found to have Brugada Type II on EKG   - Monitor on telemetry   - cards fu appreciated     Problem/Plan - 2:·  Problem: Vaginal bleeding.  Plan: - Transvaginal US c/w adenomyosis  - Seen by Gyn appreciate recs  - monitor CBC, transfuse PRN.   - outpt GI fu     Problem/Plan - 3:  ·  Problem: Brugada syndrome.  Plan: - Seen by EP, appreciate recs  - ECG: NSR with type II Brugada    - Monitor on telemetry     dc planning if cbc stable

## 2021-01-19 NOTE — PROGRESS NOTE ADULT - SUBJECTIVE AND OBJECTIVE BOX
Patient is a 50y old  Female who presents with a chief complaint of Vaginal Bleeding and Syncope (19 Jan 2021 14:57)    Date of servie : 01-19-21 @ 15:38  INTERVAL HPI/OVERNIGHT EVENTS:  T(C): 36.5 (01-19-21 @ 11:51), Max: 36.9 (01-18-21 @ 21:07)  HR: 88 (01-19-21 @ 11:51) (72 - 130)  BP: 122/74 (01-19-21 @ 11:51) (96/61 - 122/74)  RR: 18 (01-19-21 @ 11:51) (16 - 18)  SpO2: 100% (01-19-21 @ 11:51) (100% - 100%)  Wt(kg): --  I&O's Summary      LABS:                        7.7    6.99  )-----------( 264      ( 19 Jan 2021 07:42 )             24.6     01-19    139  |  106  |  16  ----------------------------<  95  4.2   |  23  |  0.73    Ca    8.6      19 Jan 2021 07:42    TPro  6.2  /  Alb  3.6  /  TBili  0.2  /  DBili  x   /  AST  24  /  ALT  17  /  AlkPhos  56  01-18        CAPILLARY BLOOD GLUCOSE                MEDICATIONS  (STANDING):  ferrous    sulfate 325 milliGRAM(s) Oral daily    MEDICATIONS  (PRN):  acetaminophen   Tablet .. 650 milliGRAM(s) Oral every 6 hours PRN Moderate Pain (4 - 6), Severe Pain (7 - 10)          PHYSICAL EXAM:  GENERAL: NAD, well-groomed, well-developed  HEAD:  Atraumatic, Normocephalic  CHEST/LUNG: Clear to percussion bilaterally; No rales, rhonchi, wheezing, or rubs  HEART: Regular rate and rhythm; No murmurs, rubs, or gallops  ABDOMEN: Soft, Nontender, Nondistended; Bowel sounds present  EXTREMITIES:  2+ Peripheral Pulses, No clubbing, cyanosis, or edema  LYMPH: No lymphadenopathy noted  SKIN: No rashes or lesions    Care Discussed with Consultants/Other Providers [ ] YES  [ ] NO

## 2021-01-20 VITALS
HEART RATE: 90 BPM | RESPIRATION RATE: 18 BRPM | TEMPERATURE: 97 F | DIASTOLIC BLOOD PRESSURE: 66 MMHG | OXYGEN SATURATION: 100 % | SYSTOLIC BLOOD PRESSURE: 104 MMHG

## 2021-01-20 LAB
ANION GAP SERPL CALC-SCNC: 11 MMOL/L — SIGNIFICANT CHANGE UP (ref 7–14)
BASOPHILS # BLD AUTO: 0.08 K/UL — SIGNIFICANT CHANGE UP (ref 0–0.2)
BASOPHILS NFR BLD AUTO: 1 % — SIGNIFICANT CHANGE UP (ref 0–2)
BLD GP AB SCN SERPL QL: NEGATIVE — SIGNIFICANT CHANGE UP
BUN SERPL-MCNC: 18 MG/DL — SIGNIFICANT CHANGE UP (ref 7–23)
CALCIUM SERPL-MCNC: 8.5 MG/DL — SIGNIFICANT CHANGE UP (ref 8.4–10.5)
CHLORIDE SERPL-SCNC: 103 MMOL/L — SIGNIFICANT CHANGE UP (ref 98–107)
CO2 SERPL-SCNC: 23 MMOL/L — SIGNIFICANT CHANGE UP (ref 22–31)
CREAT SERPL-MCNC: 0.76 MG/DL — SIGNIFICANT CHANGE UP (ref 0.5–1.3)
EOSINOPHIL # BLD AUTO: 0.15 K/UL — SIGNIFICANT CHANGE UP (ref 0–0.5)
EOSINOPHIL NFR BLD AUTO: 1.8 % — SIGNIFICANT CHANGE UP (ref 0–6)
GLUCOSE SERPL-MCNC: 120 MG/DL — HIGH (ref 70–99)
HCT VFR BLD CALC: 23.2 % — LOW (ref 34.5–45)
HGB BLD-MCNC: 7.2 G/DL — LOW (ref 11.5–15.5)
IANC: 4.86 K/UL — SIGNIFICANT CHANGE UP (ref 1.5–8.5)
IMM GRANULOCYTES NFR BLD AUTO: 2.6 % — HIGH (ref 0–1.5)
LYMPHOCYTES # BLD AUTO: 2.17 K/UL — SIGNIFICANT CHANGE UP (ref 1–3.3)
LYMPHOCYTES # BLD AUTO: 26.6 % — SIGNIFICANT CHANGE UP (ref 13–44)
MCHC RBC-ENTMCNC: 23.8 PG — LOW (ref 27–34)
MCHC RBC-ENTMCNC: 31 GM/DL — LOW (ref 32–36)
MCV RBC AUTO: 76.6 FL — LOW (ref 80–100)
MONOCYTES # BLD AUTO: 0.7 K/UL — SIGNIFICANT CHANGE UP (ref 0–0.9)
MONOCYTES NFR BLD AUTO: 8.6 % — SIGNIFICANT CHANGE UP (ref 2–14)
NEUTROPHILS # BLD AUTO: 4.86 K/UL — SIGNIFICANT CHANGE UP (ref 1.8–7.4)
NEUTROPHILS NFR BLD AUTO: 59.4 % — SIGNIFICANT CHANGE UP (ref 43–77)
NRBC # BLD: 0 /100 WBCS — SIGNIFICANT CHANGE UP
NRBC # FLD: 0.05 K/UL — HIGH
PLATELET # BLD AUTO: 277 K/UL — SIGNIFICANT CHANGE UP (ref 150–400)
POTASSIUM SERPL-MCNC: 3.4 MMOL/L — LOW (ref 3.5–5.3)
POTASSIUM SERPL-SCNC: 3.4 MMOL/L — LOW (ref 3.5–5.3)
RBC # BLD: 3.03 M/UL — LOW (ref 3.8–5.2)
RBC # FLD: 16.7 % — HIGH (ref 10.3–14.5)
RH IG SCN BLD-IMP: POSITIVE — SIGNIFICANT CHANGE UP
SODIUM SERPL-SCNC: 137 MMOL/L — SIGNIFICANT CHANGE UP (ref 135–145)
WBC # BLD: 8.17 K/UL — SIGNIFICANT CHANGE UP (ref 3.8–10.5)
WBC # FLD AUTO: 8.17 K/UL — SIGNIFICANT CHANGE UP (ref 3.8–10.5)

## 2021-01-20 PROCEDURE — 99232 SBSQ HOSP IP/OBS MODERATE 35: CPT

## 2021-01-20 RX ORDER — POTASSIUM CHLORIDE 20 MEQ
40 PACKET (EA) ORAL EVERY 4 HOURS
Refills: 0 | Status: COMPLETED | OUTPATIENT
Start: 2021-01-20 | End: 2021-01-20

## 2021-01-20 RX ORDER — ACETAMINOPHEN 500 MG
2 TABLET ORAL
Qty: 0 | Refills: 0 | DISCHARGE
Start: 2021-01-20

## 2021-01-20 RX ADMIN — Medication 40 MILLIEQUIVALENT(S): at 09:34

## 2021-01-20 RX ADMIN — Medication 325 MILLIGRAM(S): at 09:34

## 2021-01-20 RX ADMIN — Medication 650 MILLIGRAM(S): at 12:12

## 2021-01-20 RX ADMIN — Medication 40 MILLIEQUIVALENT(S): at 14:28

## 2021-01-20 NOTE — PROGRESS NOTE ADULT - ASSESSMENT
51 yo F with a pmhx of anemia presented to the ED with vaginal bleeding that had been occurring since 12/26/20, fatigue and a syncopal episode with a fall onto the floor after stepping out of the shower. Found to have significant anemia. ECG with Brugada TypeII pattern. No clinical manifestations. Telemetry monitoring shows normal sinus rhythm with episodes of sinus tachycardia to 130 bpm. No ventricular ectopy. Echo with normal systolic function LVEF 62%.   Syncope likely secondary to anemia due to vaginal bleeding. No prior history of syncope No family history of SCD.  Plan:   - If patient has fever, please treat with Tylenol immediately  - Please avoid these medications. See [email or web address]   - Keep electrolytes WNL.

## 2021-01-20 NOTE — PROGRESS NOTE ADULT - ASSESSMENT
Assessment and Plan    49y/o Female with a significant PMHx of anemia presents to [facility] with syncope    EKG: NSR with type II Brugada  Tele: NSR with episode of Sinus tach    1. Syncope  -likely secondary to symptomatic anemia from vaginal bleeding  -hemoglobin improved s/p PRBC transfusion  -brugada type 2 noted on EKG likely incidental finding. EP following  -echo with normal LV function   -continue to monitor on tele, tachycardia secondary to anemia    2. Anemia  -hemoglobin 7.2 today, to receive 1U PRBC transfusion today  -secondary to vaginal bleeding  -f/u GYN    Patient has outpatient tele appointment scheduled for January 26th at 130pm

## 2021-01-20 NOTE — DISCHARGE NOTE NURSING/CASE MANAGEMENT/SOCIAL WORK - PATIENT PORTAL LINK FT
You can access the FollowMyHealth Patient Portal offered by [facility] by registering at the following website: [email or web address]. By joining [facility]’s FollowMyHealth portal, you will also be able to view your health information using other applications (apps) compatible with our system.

## 2021-01-20 NOTE — PROGRESS NOTE ADULT - ATTENDING COMMENTS
49 yo F with a pmhx of anemia presents to the ED with vaginal bleeding complicated by syncopal episode, found to have significant anemia, now stabilized, EKG notable for possible brugada pattern, no clear clinical manifestations. No fam hx of SCD or hx of syncope. Avoid drugs listed on [email or web address], treat any fevers with anti-pyretics. Will fu as outpt.
49 yo F with a pmhx of anemia presents to the ED with vaginal bleeding complicated by syncopal episode, found to have significant anemia, now stabilized, EKG notable for possible brugada pattern, no clear clinical manifestations. No fam hx of SCD or hx of syncope. Avoid drugs listed on [email or web address], treat any fevers with anti-pyretics. Will fu as outpt.

## 2021-01-20 NOTE — PROGRESS NOTE ADULT - SUBJECTIVE AND OBJECTIVE BOX
Patient receiving a blood transfusion. Denies chest pain, shortness of breath, palpitations or lightheadedness.   No events overnight.     Vital Signs Last 24 Hrs  T(C): 36.5 (20 Jan 2021 14:32), Max: 36.9 (19 Jan 2021 22:00)  T(F): 97.7 (20 Jan 2021 14:32), Max: 98.5 (19 Jan 2021 22:00)  HR: 86 (20 Jan 2021 14:32) (70 - 90)  BP: 107/55 (20 Jan 2021 14:32) (104/57 - 122/64)  BP(mean): --  RR: 16 (20 Jan 2021 14:32) (16 - 18)  SpO2: 100% (20 Jan 2021 14:32) (100% - 100%)      EKG  Telemetry:  Normal sinus rhythm 70-90's with episodes of sinus tachycardia associated with ambulation.   MEDICATIONS  (STANDING):  ferrous    sulfate 325 milliGRAM(s) Oral daily    MEDICATIONS  (PRN):  acetaminophen   Tablet .. 650 milliGRAM(s) Oral every 6 hours PRN Moderate Pain (4 - 6), Severe Pain (7 - 10)          Physical exam:   Gen- well developed well nourished NAD  Resp- clear to auscultation. No wheezing, rales or rhonchi  CV- S1 and S2 RRR. No murmurs, gallops or rubs  ABD- soft nontender + bowel sounds   EXT- no edema or calf tenderness  Neuro- grossly nonfocal                            7.2    8.17  )-----------( 277      ( 20 Jan 2021 06:47 )             23.2       01-20    137  |  103  |  18  ----------------------------<  120<H>  3.4<L>   |  23  |  0.76    Ca    8.5      20 Jan 2021 06:47

## 2021-01-20 NOTE — PROGRESS NOTE ADULT - REASON FOR ADMISSION
Vaginal Bleeding and Syncope

## 2021-01-20 NOTE — PROGRESS NOTE ADULT - SUBJECTIVE AND OBJECTIVE BOX
Bradford Nicole MD  Interventional Cardiology / Endovascular Specialist  [place] Office : [address] [place] [address] [place]. [address]  Tel:   [place] Office : [address] [facility] [place] [address]  Tel: [phone]  Cell : [phone]    Subjective/Overnight events: Patient lying in bed comfortably. No acute distress. Denies chest pain, SOB or palpitations  	  MEDICATIONS:        acetaminophen   Tablet .. 650 milliGRAM(s) Oral every 6 hours PRN        ferrous    sulfate 325 milliGRAM(s) Oral daily  potassium chloride    Tablet ER 40 milliEquivalent(s) Oral every 4 hours      PAST MEDICAL/SURGICAL HISTORY  PAST MEDICAL & SURGICAL HISTORY:  Anemia        SOCIAL HISTORY: Substance Use (street drugs): ( x ) never used  (  ) other:    FAMILY HISTORY:      REVIEW OF SYSTEMS:  CONSTITUTIONAL: No fever, weight loss, or fatigue  EYES: No eye pain, visual disturbances, or discharge  ENMT:  No difficulty hearing, tinnitus, vertigo; No sinus or throat pain  BREASTS: No pain, masses, or nipple discharge  GASTROINTESTINAL: No abdominal or epigastric pain. No nausea, vomiting, or hematemesis; No diarrhea or constipation. No melena or hematochezia.  GENITOURINARY: No dysuria, frequency, hematuria, or incontinence  NEUROLOGICAL: No headaches, memory loss, loss of strength, numbness, or tremors  ENDOCRINE: No heat or cold intolerance; No hair loss  MUSCULOSKELETAL: No joint pain or swelling; No muscle, back, or extremity pain  PSYCHIATRIC: No depression, anxiety, mood swings, or difficulty sleeping  HEME/LYMPH: No easy bruising, or bleeding gums  All others negative    PHYSICAL EXAM:  T(C): 36.8 (01-20-21 @ 13:20), Max: 36.9 (01-19-21 @ 22:00)  HR: 87 (01-20-21 @ 13:20) (70 - 90)  BP: 104/57 (01-20-21 @ 13:20) (104/57 - 122/64)  RR: 16 (01-20-21 @ 13:20) (16 - 18)  SpO2: 100% (01-20-21 @ 13:20) (100% - 100%)  Wt(kg): --  I&O's Summary          GENERAL: NAD  EYES: EOMI, PERRLA, conjunctiva and sclera clear  ENMT: No tonsillar erythema, exudates, or enlargement  Cardiovascular: Normal S1 S2, No JVD, No murmurs, No edema  Respiratory: Lungs clear to auscultation	  Gastrointestinal:  Soft, Non-tender, + BS	  Extremities: No edema                                    7.2    8.17  )-----------( 277      ( 20 Jan 2021 06:47 )             23.2     01-20    137  |  103  |  18  ----------------------------<  120<H>  3.4<L>   |  23  |  0.76    Ca    8.5      20 Jan 2021 06:47      proBNP:   Lipid Profile:   HgA1c:   TSH:     Consultant(s) Notes Reviewed:  [x ] YES  [ ] NO    Care Discussed with Consultants/Other Providers [ x] YES  [ ] NO    Imaging Personally Reviewed independently:  [x] YES  [ ] NO    All labs, radiologic studies, vitals, orders and medications list reviewed. Patient is seen and examined at bedside. Case discussed with medical team.

## 2021-01-22 LAB
CULTURE RESULTS: SIGNIFICANT CHANGE UP
CULTURE RESULTS: SIGNIFICANT CHANGE UP
SPECIMEN SOURCE: SIGNIFICANT CHANGE UP
SPECIMEN SOURCE: SIGNIFICANT CHANGE UP

## 2021-04-27 NOTE — ED CDU PROVIDER DISPOSITION NOTE - INPATIENT RESIDENT/ACP NOTIFIED DATE
What Type Of Note Output Would You Prefer (Optional)?: Standard Output What Is The Reason For Today's Visit?: Full Body Skin Examination What Is The Reason For Today's Visit? (Being Monitored For X): concerning skin lesions on an annual basis 16-Jan-2021 22:15

## 2022-12-22 NOTE — PROGRESS NOTE ADULT - SUBJECTIVE AND OBJECTIVE BOX
Bradford Nicole MD  Interventional Cardiology / Endovascular Specialist  [place] Office : [phone] [place] [address]. [phone]  Tel:   [place] Office : [phone] [facility] [place] [address]  Tel: [phone]  Cell : [phone]    Subjective/Overnight events: Patient lying in bed. Denies chest pain, SOB or palpitations. Patient continues to have vaginal bleeding     	  MEDICATIONS:        acetaminophen   Tablet .. 650 milliGRAM(s) Oral every 6 hours PRN        ferrous    sulfate 325 milliGRAM(s) Oral daily      PAST MEDICAL/SURGICAL HISTORY  PAST MEDICAL & SURGICAL HISTORY:  Anemia        SOCIAL HISTORY: Substance Use (street drugs): ( x ) never used  (  ) other:    FAMILY HISTORY:      REVIEW OF SYSTEMS:  CONSTITUTIONAL: No fever, weight loss, or fatigue  EYES: No eye pain, visual disturbances, or discharge  ENMT:  No difficulty hearing, tinnitus, vertigo; No sinus or throat pain  BREASTS: No pain, masses, or nipple discharge  GASTROINTESTINAL: No abdominal or epigastric pain. No nausea, vomiting, or hematemesis; No diarrhea or constipation. No melena or hematochezia.  GENITOURINARY: No dysuria, frequency, hematuria, or incontinence  NEUROLOGICAL: No headaches, memory loss, loss of strength, numbness, or tremors  ENDOCRINE: No heat or cold intolerance; No hair loss  MUSCULOSKELETAL: No joint pain or swelling; No muscle, back, or extremity pain  PSYCHIATRIC: No depression, anxiety, mood swings, or difficulty sleeping  HEME/LYMPH: No easy bruising, or bleeding gums  All others negative    PHYSICAL EXAM:  T(C): 36.5 (01-19-21 @ 11:51), Max: 36.9 (01-18-21 @ 21:07)  HR: 88 (01-19-21 @ 11:51) (72 - 130)  BP: 122/74 (01-19-21 @ 11:51) (96/61 - 122/74)  RR: 18 (01-19-21 @ 11:51) (16 - 18)  SpO2: 100% (01-19-21 @ 11:51) (100% - 100%)  Wt(kg): --  I&O's Summary        GENERAL: NAD  EYES: EOMI, PERRLA, conjunctiva and sclera clear  ENMT: No tonsillar erythema, exudates, or enlargement  Cardiovascular: Normal S1 S2, No JVD, No murmurs, No edema  Respiratory: Lungs clear to auscultation	  Gastrointestinal:  Soft, Non-tender, + BS	  Extremities: No edema                                    7.7    6.99  )-----------( 264      ( 19 Jan 2021 07:42 )             24.6     01-19    139  |  106  |  16  ----------------------------<  95  4.2   |  23  |  0.73    Ca    8.6      19 Jan 2021 07:42    TPro  6.2  /  Alb  3.6  /  TBili  0.2  /  DBili  x   /  AST  24  /  ALT  17  /  AlkPhos  56  01-18    proBNP:   Lipid Profile:   HgA1c:   TSH:     Consultant(s) Notes Reviewed:  [x ] YES  [ ] NO    Care Discussed with Consultants/Other Providers [ x] YES  [ ] NO    Imaging Personally Reviewed independently:  [x] YES  [ ] NO    All labs, radiologic studies, vitals, orders and medications list reviewed. Patient is seen and examined at bedside. Case discussed with medical team.               [FreeTextEntry1] : Procedure: Microscopic Ear Exam\par \par Left ear:  \par Ear canal intact without inflammation or lesion.  \par Tympanic membrane intact without inflammation.\par \par \par Right ear:  \par Ear canal intact without inflammation or lesion.  Tympanic membrane intact.  Slightly bulging and opaque.  No acute inflammation.  No visible effusion\par \par [place] Hearing Assessment\par 512 Hz:\par Lyles test: referred to the right ear\par  [Nasal Endoscopy Performed] : nasal endoscopy was performed, see procedure section for findings [de-identified] : Enlarged [Midline] : trachea located in midline position [Normal] : no rashes

## 2023-06-11 NOTE — CONSULT NOTE ADULT - ATTENDING COMMENTS
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 C/w tele, tte, prbc ordered. Follow complete EP team  reccs. Outpt GYN followup
warm